# Patient Record
Sex: FEMALE | Race: OTHER | NOT HISPANIC OR LATINO
[De-identification: names, ages, dates, MRNs, and addresses within clinical notes are randomized per-mention and may not be internally consistent; named-entity substitution may affect disease eponyms.]

---

## 2017-01-16 ENCOUNTER — TRANSCRIPTION ENCOUNTER (OUTPATIENT)
Age: 37
End: 2017-01-16

## 2017-02-06 ENCOUNTER — TRANSCRIPTION ENCOUNTER (OUTPATIENT)
Age: 37
End: 2017-02-06

## 2023-04-19 ENCOUNTER — INPATIENT (INPATIENT)
Facility: HOSPITAL | Age: 43
LOS: 2 days | Discharge: HOME | DRG: 418 | End: 2023-04-22
Attending: STUDENT IN AN ORGANIZED HEALTH CARE EDUCATION/TRAINING PROGRAM | Admitting: STUDENT IN AN ORGANIZED HEALTH CARE EDUCATION/TRAINING PROGRAM
Payer: COMMERCIAL

## 2023-04-19 ENCOUNTER — TRANSCRIPTION ENCOUNTER (OUTPATIENT)
Age: 43
End: 2023-04-19

## 2023-04-19 VITALS
HEIGHT: 61 IN | WEIGHT: 259.93 LBS | DIASTOLIC BLOOD PRESSURE: 90 MMHG | RESPIRATION RATE: 16 BRPM | OXYGEN SATURATION: 97 % | SYSTOLIC BLOOD PRESSURE: 130 MMHG | HEART RATE: 73 BPM | TEMPERATURE: 98 F

## 2023-04-19 DIAGNOSIS — K81.0 ACUTE CHOLECYSTITIS: ICD-10-CM

## 2023-04-19 LAB
ANION GAP SERPL CALC-SCNC: 11 MMOL/L — SIGNIFICANT CHANGE UP (ref 5–17)
APPEARANCE UR: ABNORMAL
APTT BLD: 31.1 SEC — SIGNIFICANT CHANGE UP (ref 27.5–35.5)
BACTERIA # UR AUTO: PRESENT /HPF
BASOPHILS # BLD AUTO: 0.09 K/UL — SIGNIFICANT CHANGE UP (ref 0–0.2)
BASOPHILS NFR BLD AUTO: 0.7 % — SIGNIFICANT CHANGE UP (ref 0–2)
BILIRUB UR-MCNC: ABNORMAL
BLD GP AB SCN SERPL QL: NEGATIVE — SIGNIFICANT CHANGE UP
BUN SERPL-MCNC: 9 MG/DL — SIGNIFICANT CHANGE UP (ref 7–23)
CALCIUM SERPL-MCNC: 9.7 MG/DL — SIGNIFICANT CHANGE UP (ref 8.4–10.5)
CHLORIDE SERPL-SCNC: 101 MMOL/L — SIGNIFICANT CHANGE UP (ref 96–108)
CO2 SERPL-SCNC: 26 MMOL/L — SIGNIFICANT CHANGE UP (ref 22–31)
COLOR SPEC: YELLOW — SIGNIFICANT CHANGE UP
CREAT SERPL-MCNC: 0.75 MG/DL — SIGNIFICANT CHANGE UP (ref 0.5–1.3)
DIFF PNL FLD: NEGATIVE — SIGNIFICANT CHANGE UP
EGFR: 101 ML/MIN/1.73M2 — SIGNIFICANT CHANGE UP
EOSINOPHIL # BLD AUTO: 0.18 K/UL — SIGNIFICANT CHANGE UP (ref 0–0.5)
EOSINOPHIL NFR BLD AUTO: 1.4 % — SIGNIFICANT CHANGE UP (ref 0–6)
EPI CELLS # UR: ABNORMAL /HPF (ref 0–5)
GLUCOSE SERPL-MCNC: 141 MG/DL — HIGH (ref 70–99)
GLUCOSE UR QL: NEGATIVE — SIGNIFICANT CHANGE UP
HCG SERPL-ACNC: <0 MIU/ML — SIGNIFICANT CHANGE UP
HCT VFR BLD CALC: 43.1 % — SIGNIFICANT CHANGE UP (ref 34.5–45)
HGB BLD-MCNC: 13.9 G/DL — SIGNIFICANT CHANGE UP (ref 11.5–15.5)
HYALINE CASTS # UR AUTO: SIGNIFICANT CHANGE UP /LPF (ref 0–2)
IMM GRANULOCYTES NFR BLD AUTO: 0.2 % — SIGNIFICANT CHANGE UP (ref 0–0.9)
INR BLD: 1.22 — HIGH (ref 0.88–1.16)
KETONES UR-MCNC: ABNORMAL MG/DL
LEUKOCYTE ESTERASE UR-ACNC: NEGATIVE — SIGNIFICANT CHANGE UP
LYMPHOCYTES # BLD AUTO: 2.82 K/UL — SIGNIFICANT CHANGE UP (ref 1–3.3)
LYMPHOCYTES # BLD AUTO: 21.8 % — SIGNIFICANT CHANGE UP (ref 13–44)
MCHC RBC-ENTMCNC: 30.3 PG — SIGNIFICANT CHANGE UP (ref 27–34)
MCHC RBC-ENTMCNC: 32.3 GM/DL — SIGNIFICANT CHANGE UP (ref 32–36)
MCV RBC AUTO: 94.1 FL — SIGNIFICANT CHANGE UP (ref 80–100)
MONOCYTES # BLD AUTO: 0.9 K/UL — SIGNIFICANT CHANGE UP (ref 0–0.9)
MONOCYTES NFR BLD AUTO: 7 % — SIGNIFICANT CHANGE UP (ref 2–14)
NEUTROPHILS # BLD AUTO: 8.91 K/UL — HIGH (ref 1.8–7.4)
NEUTROPHILS NFR BLD AUTO: 68.9 % — SIGNIFICANT CHANGE UP (ref 43–77)
NITRITE UR-MCNC: NEGATIVE — SIGNIFICANT CHANGE UP
NRBC # BLD: 0 /100 WBCS — SIGNIFICANT CHANGE UP (ref 0–0)
PH UR: 5.5 — SIGNIFICANT CHANGE UP (ref 5–8)
PLATELET # BLD AUTO: 379 K/UL — SIGNIFICANT CHANGE UP (ref 150–400)
POTASSIUM SERPL-MCNC: 4.2 MMOL/L — SIGNIFICANT CHANGE UP (ref 3.5–5.3)
POTASSIUM SERPL-SCNC: 4.2 MMOL/L — SIGNIFICANT CHANGE UP (ref 3.5–5.3)
PROT UR-MCNC: 30 MG/DL
PROTHROM AB SERPL-ACNC: 14.6 SEC — HIGH (ref 10.5–13.4)
RBC # BLD: 4.58 M/UL — SIGNIFICANT CHANGE UP (ref 3.8–5.2)
RBC # FLD: 12.9 % — SIGNIFICANT CHANGE UP (ref 10.3–14.5)
RBC CASTS # UR COMP ASSIST: < 5 /HPF — SIGNIFICANT CHANGE UP
RH IG SCN BLD-IMP: POSITIVE — SIGNIFICANT CHANGE UP
RH IG SCN BLD-IMP: POSITIVE — SIGNIFICANT CHANGE UP
SODIUM SERPL-SCNC: 138 MMOL/L — SIGNIFICANT CHANGE UP (ref 135–145)
SP GR SPEC: >=1.03 — SIGNIFICANT CHANGE UP (ref 1–1.03)
UROBILINOGEN FLD QL: 0.2 E.U./DL — SIGNIFICANT CHANGE UP
WBC # BLD: 12.93 K/UL — HIGH (ref 3.8–10.5)
WBC # FLD AUTO: 12.93 K/UL — HIGH (ref 3.8–10.5)
WBC UR QL: < 5 /HPF — SIGNIFICANT CHANGE UP

## 2023-04-19 PROCEDURE — 99222 1ST HOSP IP/OBS MODERATE 55: CPT | Mod: 57

## 2023-04-19 PROCEDURE — 99285 EMERGENCY DEPT VISIT HI MDM: CPT

## 2023-04-19 PROCEDURE — 76705 ECHO EXAM OF ABDOMEN: CPT | Mod: 26

## 2023-04-19 RX ORDER — CEFTRIAXONE 500 MG/1
2000 INJECTION, POWDER, FOR SOLUTION INTRAMUSCULAR; INTRAVENOUS ONCE
Refills: 0 | Status: COMPLETED | OUTPATIENT
Start: 2023-04-19 | End: 2023-04-19

## 2023-04-19 RX ORDER — HYDROMORPHONE HYDROCHLORIDE 2 MG/ML
0.5 INJECTION INTRAMUSCULAR; INTRAVENOUS; SUBCUTANEOUS EVERY 4 HOURS
Refills: 0 | Status: DISCONTINUED | OUTPATIENT
Start: 2023-04-19 | End: 2023-04-20

## 2023-04-19 RX ORDER — ACETAMINOPHEN 500 MG
1000 TABLET ORAL ONCE
Refills: 0 | Status: COMPLETED | OUTPATIENT
Start: 2023-04-19 | End: 2023-04-19

## 2023-04-19 RX ORDER — SODIUM CHLORIDE 9 MG/ML
1000 INJECTION, SOLUTION INTRAVENOUS
Refills: 0 | Status: DISCONTINUED | OUTPATIENT
Start: 2023-04-19 | End: 2023-04-20

## 2023-04-19 RX ORDER — SODIUM CHLORIDE 9 MG/ML
1000 INJECTION INTRAMUSCULAR; INTRAVENOUS; SUBCUTANEOUS ONCE
Refills: 0 | Status: COMPLETED | OUTPATIENT
Start: 2023-04-19 | End: 2023-04-19

## 2023-04-19 RX ORDER — MORPHINE SULFATE 50 MG/1
4 CAPSULE, EXTENDED RELEASE ORAL ONCE
Refills: 0 | Status: DISCONTINUED | OUTPATIENT
Start: 2023-04-19 | End: 2023-04-19

## 2023-04-19 RX ORDER — PIPERACILLIN AND TAZOBACTAM 4; .5 G/20ML; G/20ML
3.38 INJECTION, POWDER, LYOPHILIZED, FOR SOLUTION INTRAVENOUS ONCE
Refills: 0 | Status: COMPLETED | OUTPATIENT
Start: 2023-04-19 | End: 2023-04-19

## 2023-04-19 RX ORDER — ONDANSETRON 8 MG/1
4 TABLET, FILM COATED ORAL ONCE
Refills: 0 | Status: COMPLETED | OUTPATIENT
Start: 2023-04-19 | End: 2023-04-19

## 2023-04-19 RX ORDER — METRONIDAZOLE 500 MG
500 TABLET ORAL ONCE
Refills: 0 | Status: COMPLETED | OUTPATIENT
Start: 2023-04-19 | End: 2023-04-19

## 2023-04-19 RX ORDER — HEPARIN SODIUM 5000 [USP'U]/ML
7500 INJECTION INTRAVENOUS; SUBCUTANEOUS EVERY 8 HOURS
Refills: 0 | Status: DISCONTINUED | OUTPATIENT
Start: 2023-04-19 | End: 2023-04-20

## 2023-04-19 RX ORDER — ONDANSETRON 8 MG/1
4 TABLET, FILM COATED ORAL EVERY 6 HOURS
Refills: 0 | Status: DISCONTINUED | OUTPATIENT
Start: 2023-04-19 | End: 2023-04-20

## 2023-04-19 RX ORDER — PIPERACILLIN AND TAZOBACTAM 4; .5 G/20ML; G/20ML
3.38 INJECTION, POWDER, LYOPHILIZED, FOR SOLUTION INTRAVENOUS EVERY 8 HOURS
Refills: 0 | Status: DISCONTINUED | OUTPATIENT
Start: 2023-04-19 | End: 2023-04-20

## 2023-04-19 RX ADMIN — Medication 400 MILLIGRAM(S): at 10:30

## 2023-04-19 RX ADMIN — CEFTRIAXONE 100 MILLIGRAM(S): 500 INJECTION, POWDER, FOR SOLUTION INTRAMUSCULAR; INTRAVENOUS at 06:39

## 2023-04-19 RX ADMIN — ONDANSETRON 4 MILLIGRAM(S): 8 TABLET, FILM COATED ORAL at 07:42

## 2023-04-19 RX ADMIN — PIPERACILLIN AND TAZOBACTAM 25 GRAM(S): 4; .5 INJECTION, POWDER, LYOPHILIZED, FOR SOLUTION INTRAVENOUS at 22:21

## 2023-04-19 RX ADMIN — HYDROMORPHONE HYDROCHLORIDE 0.5 MILLIGRAM(S): 2 INJECTION INTRAMUSCULAR; INTRAVENOUS; SUBCUTANEOUS at 19:00

## 2023-04-19 RX ADMIN — HYDROMORPHONE HYDROCHLORIDE 0.5 MILLIGRAM(S): 2 INJECTION INTRAMUSCULAR; INTRAVENOUS; SUBCUTANEOUS at 15:33

## 2023-04-19 RX ADMIN — CEFTRIAXONE 2000 MILLIGRAM(S): 500 INJECTION, POWDER, FOR SOLUTION INTRAMUSCULAR; INTRAVENOUS at 07:05

## 2023-04-19 RX ADMIN — HEPARIN SODIUM 7500 UNIT(S): 5000 INJECTION INTRAVENOUS; SUBCUTANEOUS at 14:21

## 2023-04-19 RX ADMIN — MORPHINE SULFATE 4 MILLIGRAM(S): 50 CAPSULE, EXTENDED RELEASE ORAL at 06:46

## 2023-04-19 RX ADMIN — Medication 500 MILLIGRAM(S): at 06:40

## 2023-04-19 RX ADMIN — PIPERACILLIN AND TAZOBACTAM 200 GRAM(S): 4; .5 INJECTION, POWDER, LYOPHILIZED, FOR SOLUTION INTRAVENOUS at 13:07

## 2023-04-19 RX ADMIN — HYDROMORPHONE HYDROCHLORIDE 0.5 MILLIGRAM(S): 2 INJECTION INTRAMUSCULAR; INTRAVENOUS; SUBCUTANEOUS at 14:22

## 2023-04-19 RX ADMIN — HYDROMORPHONE HYDROCHLORIDE 0.5 MILLIGRAM(S): 2 INJECTION INTRAMUSCULAR; INTRAVENOUS; SUBCUTANEOUS at 23:56

## 2023-04-19 RX ADMIN — MORPHINE SULFATE 4 MILLIGRAM(S): 50 CAPSULE, EXTENDED RELEASE ORAL at 04:06

## 2023-04-19 RX ADMIN — SODIUM CHLORIDE 130 MILLILITER(S): 9 INJECTION, SOLUTION INTRAVENOUS at 12:49

## 2023-04-19 RX ADMIN — ONDANSETRON 4 MILLIGRAM(S): 8 TABLET, FILM COATED ORAL at 17:09

## 2023-04-19 RX ADMIN — ONDANSETRON 4 MILLIGRAM(S): 8 TABLET, FILM COATED ORAL at 04:06

## 2023-04-19 RX ADMIN — Medication 100 MILLIGRAM(S): at 06:46

## 2023-04-19 RX ADMIN — HYDROMORPHONE HYDROCHLORIDE 0.5 MILLIGRAM(S): 2 INJECTION INTRAMUSCULAR; INTRAVENOUS; SUBCUTANEOUS at 23:26

## 2023-04-19 RX ADMIN — HEPARIN SODIUM 7500 UNIT(S): 5000 INJECTION INTRAVENOUS; SUBCUTANEOUS at 22:21

## 2023-04-19 RX ADMIN — MORPHINE SULFATE 4 MILLIGRAM(S): 50 CAPSULE, EXTENDED RELEASE ORAL at 04:36

## 2023-04-19 RX ADMIN — ONDANSETRON 4 MILLIGRAM(S): 8 TABLET, FILM COATED ORAL at 23:26

## 2023-04-19 RX ADMIN — HYDROMORPHONE HYDROCHLORIDE 0.5 MILLIGRAM(S): 2 INJECTION INTRAMUSCULAR; INTRAVENOUS; SUBCUTANEOUS at 18:22

## 2023-04-19 RX ADMIN — SODIUM CHLORIDE 1000 MILLILITER(S): 9 INJECTION INTRAMUSCULAR; INTRAVENOUS; SUBCUTANEOUS at 04:06

## 2023-04-19 NOTE — ED PROVIDER NOTE - OBJECTIVE STATEMENT
42 yr old female, denies medical history, no surg hx, presents to the Emergency Department with abd pain. initially w upper abd pain / bloating sensation two days ago. eventually resolved on its own. saw a GI doctor yesterday who daija labs and ordered a RUQ US (scheduled for later today). since appointment had multiple episodes of pain. most recent one started at 8pm tonight - had eaten dinner at 6pm. sharp / stabbing pain upper abd into right back. nausea w/o vomiting. feels bloated.   no fever, chest pain, sob, v/d, urinary symptoms.   has had some upper abd discomfort and bloating in past but nothing similar to this pain.

## 2023-04-19 NOTE — H&P ADULT - HISTORY OF PRESENT ILLNESS
42 year old female with no past medical or surgical history presenting to the ED with RUQ pain x 4 days. Patient reports after eating a cream cheese bagel on Sunday, she developed severe RUQ pain. Patient describes the pain as dull and intermittent for the past 4 days ago but since last night has been constant. Patient reports the pain worsens after eating a fatty meal. She reports the pain is associated with abdominal bloating, nausea, and vomiting. She reports she had a similar episode 7 days ago which self-resolved. She reports she has had 4 episodes of non-bilious emesis since Sunday. She reports she saw a gastroenterologist x3 days ago who ordered an abdominal ultrasound (scheduled for today) and prescribed her an antacid (unknown name) that provided some relief yesterday. Patient reports she is passing flatus and having bowel movements. Patient denies past abdominal surgery, fever, chills, chest pain, hematochezia, melena, hematuria, dysuria.          VS T 98.6, HR 60, /77, RR 18 , O2 98%       Pmhx   -Denies    Pshx   -Denies     Home medications   -None    WBC 12.93 , h/h 13.9/43.1 , plt 379     PT 14.6, INR 1.22, aptt 31.1     Na 138, K 4.2, Cl 101 , CO 26, BUN 9, Cr 0.75, gluc 141, t bili 0.4 , direct bili <0.2, alk phos 65, AST 15, ALT 7, Lipase 24          US: Bile ducts: Common bile duct measures 11 mm, which is thickened.     Gallbladder: Cholelithiasis. Gallbladder wall is thickened to 0.6 cm,      there is pericholecystic fluid and positive sonographic Walters's sign.     IMPRESSION: Cholelithiasis with acute cholecystitis.

## 2023-04-19 NOTE — H&P ADULT - ASSESSMENT
42 year old female with no significant past medical history presents with cholecystitis  NPO/IVF  Pain/nausea control  OOBA/SCDs/SQH  AM labs  OR for lap cholecystectomy    Patient  seen and examined with Dr. Hauser 42 year old female with no significant past medical history presents with cholecystitis  NPO/IVF  Pain/nausea control  OOBA/SCDs/SQH  AM labs  Zosyn  OR for lap cholecystectomy    Patient  seen and examined with Dr. Hauser

## 2023-04-19 NOTE — PATIENT PROFILE ADULT - SAFE PLACE TO LIVE
[Home] : at home, [unfilled] , at the time of the visit. [Family Member] : family member [Medical Office: ([facility])___] : at the medical office located in  [Verbal consent obtained from patient] : the patient, [unfilled] [Other: ___] : [unfilled] [FreeTextEntry1] : "Trigeminal neuralgia" [de-identified] : Susie Leger is a pleasant 53 year old lady who presented with TN (right side)for about 8-9 years.  Pain is present once per month for about 5 - 10 days interval.  She had a recent ER visit 4/27/10 with pain 10/10 that was present for about 6 weeks. Pain medications was increased by her neurologist however the pain did not subside.   Pain is increased with washing her face, eating, touching.  Pain is not relieved  with Tegretol 600 mg 3 x day and Tegretol 300 mg 2 x day as well as Gabapentin 400 mg in the morning 300 mg in the evening.  Pain has been constant at 10/10 without any relief.  Burchiel Type 1 TN described.\par \par Her Neurologist is Dr. Anastasia Escobar.  her Endocrinologist Dr. Nehemiah Maxwell.  PCP is Dr. Ken John.\par \par She has a medical history of migraines, arthritis, fibromyalgia, NAKUL (she does not use machines because she is claustrophobic), and HLD, DM.  Most recent HgbA1C was 11.2 last week after 1 month treatment for bronchitis with Prednisone.\par \par Telehealth visit started at 12:30 - 1248 pm.\par \par BMI is 56.\par  no

## 2023-04-19 NOTE — ED PROVIDER NOTE - PROGRESS NOTE DETAILS
grey -   wbc count 12.93. labs otherwise ok - lfts / lipase wnl.   UA w/o infection.   US w cholelithiasis / acute cholecystitis.   informed pt of results, pain controlled / rpt abd exam benign. given abx. surgery consulted. to see pt.   signed out to day team pending surg eval / admission. Alma - signed out to me pending surgery dispo - pt to be admitted for acute choley.  Pt stable at admission.

## 2023-04-19 NOTE — ED PROVIDER NOTE - CLINICAL SUMMARY MEDICAL DECISION MAKING FREE TEXT BOX
no medical / surgical. here w two days of upper abd pain. intermittent but now constant since 8pm last night. nausea w/o vomiting.   pt appears uncomfortable but nontoxic, stable vitals, exam w epigastric and ruq tenderness.   hx and exam most concerning for cholelithiasis / cholecystitis. ddx also includes gerd, gastritis, pud, pancreatitis  plan - labs, ruq us  analgesia / antiemetics prn  serial abd exams

## 2023-04-19 NOTE — ED ADULT NURSE REASSESSMENT NOTE - NS ED NURSE REASSESS COMMENT FT1
Patient c/o sudden onset of chest pressure and nausea. MD Marquez made aware, ekg completed.
Report received from night RN. Pt resting comfortably in stretcher. AOx4. Breathing spontaneously and unlabored. Awaiting to see.
surgery @ bedside
Patient stated no longer have chest discomfort at this time, refused morphine. Nausea has greatly subsided after Zofran.

## 2023-04-19 NOTE — ED PROVIDER NOTE - PHYSICAL EXAMINATION
Constitutional : appears uncomfortable but nontoxic.  awake, alert, oriented to person, place, time/situation.  Head : head normocephalic, atraumatic  EENMT : eyes clear bilaterally, PERRL, EOMI. airway patent. moist mucous membranes. neck supple.  Cardiac : Normal rate, regular rhythm. No murmur appreciated, no LE edema.  Resp : Breath sounds clear and equal bilaterally. Respirations even and unlabored.   Gastro : abdomen soft, nondistended. +epigastric and RUQ tenderness. no rebound or guarding. no CVAT.  MSK :  range of motion is not limited, no muscle or joint tenderness  Vasc : Extremities warm and well perfused. 2+ radial and DP pulses. cap refill <2 seconds  Neuro : Alert and oriented, CNII-XII grossly intact, no focal deficits, no motor or sensory deficits.  Skin : Skin normal color for race, warm, dry and intact. No evidence of rash.  Psych : Alert and oriented to person, place, time/situation. normal mood and affect. no apparent risk to self or others.

## 2023-04-20 ENCOUNTER — TRANSCRIPTION ENCOUNTER (OUTPATIENT)
Age: 43
End: 2023-04-20

## 2023-04-20 ENCOUNTER — RESULT REVIEW (OUTPATIENT)
Age: 43
End: 2023-04-20

## 2023-04-20 LAB
ALBUMIN SERPL ELPH-MCNC: 3.8 G/DL — SIGNIFICANT CHANGE UP (ref 3.3–5)
ALP SERPL-CCNC: 71 U/L — SIGNIFICANT CHANGE UP (ref 40–120)
ALT FLD-CCNC: 23 U/L — SIGNIFICANT CHANGE UP (ref 10–45)
ANION GAP SERPL CALC-SCNC: 13 MMOL/L — SIGNIFICANT CHANGE UP (ref 5–17)
AST SERPL-CCNC: 37 U/L — SIGNIFICANT CHANGE UP (ref 10–40)
BILIRUB SERPL-MCNC: 0.3 MG/DL — SIGNIFICANT CHANGE UP (ref 0.2–1.2)
BUN SERPL-MCNC: 8 MG/DL — SIGNIFICANT CHANGE UP (ref 7–23)
CALCIUM SERPL-MCNC: 8.9 MG/DL — SIGNIFICANT CHANGE UP (ref 8.4–10.5)
CHLORIDE SERPL-SCNC: 103 MMOL/L — SIGNIFICANT CHANGE UP (ref 96–108)
CO2 SERPL-SCNC: 22 MMOL/L — SIGNIFICANT CHANGE UP (ref 22–31)
CREAT SERPL-MCNC: 0.77 MG/DL — SIGNIFICANT CHANGE UP (ref 0.5–1.3)
EGFR: 98 ML/MIN/1.73M2 — SIGNIFICANT CHANGE UP
GLUCOSE SERPL-MCNC: 144 MG/DL — HIGH (ref 70–99)
HCT VFR BLD CALC: 41.8 % — SIGNIFICANT CHANGE UP (ref 34.5–45)
HGB BLD-MCNC: 13.6 G/DL — SIGNIFICANT CHANGE UP (ref 11.5–15.5)
MAGNESIUM SERPL-MCNC: 2 MG/DL — SIGNIFICANT CHANGE UP (ref 1.6–2.6)
MCHC RBC-ENTMCNC: 30.2 PG — SIGNIFICANT CHANGE UP (ref 27–34)
MCHC RBC-ENTMCNC: 32.5 GM/DL — SIGNIFICANT CHANGE UP (ref 32–36)
MCV RBC AUTO: 92.9 FL — SIGNIFICANT CHANGE UP (ref 80–100)
NRBC # BLD: 0 /100 WBCS — SIGNIFICANT CHANGE UP (ref 0–0)
PHOSPHATE SERPL-MCNC: 3.3 MG/DL — SIGNIFICANT CHANGE UP (ref 2.5–4.5)
PLATELET # BLD AUTO: 400 K/UL — SIGNIFICANT CHANGE UP (ref 150–400)
POTASSIUM SERPL-MCNC: 4.1 MMOL/L — SIGNIFICANT CHANGE UP (ref 3.5–5.3)
POTASSIUM SERPL-SCNC: 4.1 MMOL/L — SIGNIFICANT CHANGE UP (ref 3.5–5.3)
PROT SERPL-MCNC: 7 G/DL — SIGNIFICANT CHANGE UP (ref 6–8.3)
RBC # BLD: 4.5 M/UL — SIGNIFICANT CHANGE UP (ref 3.8–5.2)
RBC # FLD: 13 % — SIGNIFICANT CHANGE UP (ref 10.3–14.5)
SARS-COV-2 RNA SPEC QL NAA+PROBE: SIGNIFICANT CHANGE UP
SODIUM SERPL-SCNC: 138 MMOL/L — SIGNIFICANT CHANGE UP (ref 135–145)
WBC # BLD: 11.91 K/UL — HIGH (ref 3.8–10.5)
WBC # FLD AUTO: 11.91 K/UL — HIGH (ref 3.8–10.5)

## 2023-04-20 PROCEDURE — 47562 LAPAROSCOPIC CHOLECYSTECTOMY: CPT | Mod: 22

## 2023-04-20 PROCEDURE — 88304 TISSUE EXAM BY PATHOLOGIST: CPT | Mod: 26

## 2023-04-20 PROCEDURE — S2900 ROBOTIC SURGICAL SYSTEM: CPT | Mod: NC

## 2023-04-20 DEVICE — ARISTA 3GR: Type: IMPLANTABLE DEVICE | Status: FUNCTIONAL

## 2023-04-20 DEVICE — LIGATING CLIPS WECK HEMOLOK POLYMER LARGE (PURPLE) 6: Type: IMPLANTABLE DEVICE | Status: FUNCTIONAL

## 2023-04-20 RX ORDER — ONDANSETRON 8 MG/1
4 TABLET, FILM COATED ORAL EVERY 6 HOURS
Refills: 0 | Status: DISCONTINUED | OUTPATIENT
Start: 2023-04-20 | End: 2023-04-22

## 2023-04-20 RX ORDER — PIPERACILLIN AND TAZOBACTAM 4; .5 G/20ML; G/20ML
3.38 INJECTION, POWDER, LYOPHILIZED, FOR SOLUTION INTRAVENOUS EVERY 8 HOURS
Refills: 0 | Status: DISCONTINUED | OUTPATIENT
Start: 2023-04-20 | End: 2023-04-22

## 2023-04-20 RX ORDER — HYDROMORPHONE HYDROCHLORIDE 2 MG/ML
0.5 INJECTION INTRAMUSCULAR; INTRAVENOUS; SUBCUTANEOUS
Refills: 0 | Status: DISCONTINUED | OUTPATIENT
Start: 2023-04-20 | End: 2023-04-22

## 2023-04-20 RX ORDER — PANTOPRAZOLE SODIUM 20 MG/1
40 TABLET, DELAYED RELEASE ORAL ONCE
Refills: 0 | Status: COMPLETED | OUTPATIENT
Start: 2023-04-20 | End: 2023-04-20

## 2023-04-20 RX ORDER — OXYCODONE HYDROCHLORIDE 5 MG/1
5 TABLET ORAL EVERY 6 HOURS
Refills: 0 | Status: DISCONTINUED | OUTPATIENT
Start: 2023-04-20 | End: 2023-04-22

## 2023-04-20 RX ORDER — ACETAMINOPHEN 500 MG
650 TABLET ORAL EVERY 6 HOURS
Refills: 0 | Status: DISCONTINUED | OUTPATIENT
Start: 2023-04-20 | End: 2023-04-21

## 2023-04-20 RX ORDER — ONDANSETRON 8 MG/1
4 TABLET, FILM COATED ORAL ONCE
Refills: 0 | Status: COMPLETED | OUTPATIENT
Start: 2023-04-20 | End: 2023-04-20

## 2023-04-20 RX ORDER — FAMOTIDINE 10 MG/ML
20 INJECTION INTRAVENOUS ONCE
Refills: 0 | Status: COMPLETED | OUTPATIENT
Start: 2023-04-20 | End: 2023-04-20

## 2023-04-20 RX ORDER — OXYCODONE HYDROCHLORIDE 5 MG/1
10 TABLET ORAL EVERY 6 HOURS
Refills: 0 | Status: DISCONTINUED | OUTPATIENT
Start: 2023-04-20 | End: 2023-04-22

## 2023-04-20 RX ORDER — ACETAMINOPHEN 500 MG
1000 TABLET ORAL ONCE
Refills: 0 | Status: COMPLETED | OUTPATIENT
Start: 2023-04-20 | End: 2023-04-20

## 2023-04-20 RX ORDER — HEPARIN SODIUM 5000 [USP'U]/ML
7500 INJECTION INTRAVENOUS; SUBCUTANEOUS EVERY 8 HOURS
Refills: 0 | Status: DISCONTINUED | OUTPATIENT
Start: 2023-04-20 | End: 2023-04-22

## 2023-04-20 RX ORDER — SODIUM CHLORIDE 9 MG/ML
1000 INJECTION, SOLUTION INTRAVENOUS
Refills: 0 | Status: DISCONTINUED | OUTPATIENT
Start: 2023-04-20 | End: 2023-04-21

## 2023-04-20 RX ADMIN — Medication 400 MILLIGRAM(S): at 12:59

## 2023-04-20 RX ADMIN — PIPERACILLIN AND TAZOBACTAM 25 GRAM(S): 4; .5 INJECTION, POWDER, LYOPHILIZED, FOR SOLUTION INTRAVENOUS at 13:30

## 2023-04-20 RX ADMIN — Medication 1000 MILLIGRAM(S): at 13:15

## 2023-04-20 RX ADMIN — OXYCODONE HYDROCHLORIDE 10 MILLIGRAM(S): 5 TABLET ORAL at 22:21

## 2023-04-20 RX ADMIN — Medication 400 MILLIGRAM(S): at 18:36

## 2023-04-20 RX ADMIN — HEPARIN SODIUM 7500 UNIT(S): 5000 INJECTION INTRAVENOUS; SUBCUTANEOUS at 21:02

## 2023-04-20 RX ADMIN — PIPERACILLIN AND TAZOBACTAM 25 GRAM(S): 4; .5 INJECTION, POWDER, LYOPHILIZED, FOR SOLUTION INTRAVENOUS at 22:22

## 2023-04-20 RX ADMIN — Medication 400 MILLIGRAM(S): at 01:19

## 2023-04-20 RX ADMIN — Medication 1000 MILLIGRAM(S): at 19:22

## 2023-04-20 RX ADMIN — Medication 1000 MILLIGRAM(S): at 01:49

## 2023-04-20 RX ADMIN — SODIUM CHLORIDE 130 MILLILITER(S): 9 INJECTION, SOLUTION INTRAVENOUS at 13:00

## 2023-04-20 RX ADMIN — HYDROMORPHONE HYDROCHLORIDE 0.5 MILLIGRAM(S): 2 INJECTION INTRAMUSCULAR; INTRAVENOUS; SUBCUTANEOUS at 13:30

## 2023-04-20 RX ADMIN — HYDROMORPHONE HYDROCHLORIDE 0.5 MILLIGRAM(S): 2 INJECTION INTRAMUSCULAR; INTRAVENOUS; SUBCUTANEOUS at 14:00

## 2023-04-20 RX ADMIN — OXYCODONE HYDROCHLORIDE 10 MILLIGRAM(S): 5 TABLET ORAL at 23:21

## 2023-04-20 RX ADMIN — ONDANSETRON 4 MILLIGRAM(S): 8 TABLET, FILM COATED ORAL at 22:22

## 2023-04-20 RX ADMIN — ONDANSETRON 4 MILLIGRAM(S): 8 TABLET, FILM COATED ORAL at 18:36

## 2023-04-20 RX ADMIN — PANTOPRAZOLE SODIUM 40 MILLIGRAM(S): 20 TABLET, DELAYED RELEASE ORAL at 18:36

## 2023-04-20 RX ADMIN — FAMOTIDINE 20 MILLIGRAM(S): 10 INJECTION INTRAVENOUS at 23:10

## 2023-04-20 NOTE — PRE-ANESTHESIA EVALUATION ADULT - NSANTHOSAYNRD_GEN_A_CORE
No. CIRO screening performed.  STOP BANG Legend: 0-2 = LOW Risk; 3-4 = INTERMEDIATE Risk; 5-8 = HIGH Risk
20 Hour(s) 32 Minute(s)

## 2023-04-20 NOTE — BRIEF OPERATIVE NOTE - ESTIMATED BLOOD LOSS
74
Detail Level: Detailed
General Sunscreen Counseling: I recommended a broad spectrum sunscreen with a SPF of 30 or higher.  I explained that SPF 30 sunscreens block approximately 97 percent of the sun's harmful rays.  Sunscreens should be applied at least 15 minutes prior to expected sun exposure and then every 2 hours after that as long as sun exposure continues. If swimming or exercising sunscreen should be reapplied every 45 minutes to an hour after getting wet or sweating.  One ounce, or the equivalent of a shot glass full of sunscreen, is adequate to protect the skin not covered by a bathing suit. I also recommended a lip balm with a sunscreen as well. Sun protective clothing can be used in lieu of sunscreen but must be worn the entire time you are exposed to the sun's rays.

## 2023-04-20 NOTE — PRE-ANESTHESIA EVALUATION ADULT - NSANTHPEFT_GEN_ALL_CORE
General: Appearance is consistent with chronological age. No abnormal facies.  EENT: Anicteric sclera; oropharynx clear, moist mucus membranes  Cardiovascular:  Rate and rhythm evaluated  Respiratory: Unlabored breathing  Neurological: Awake and alert, moves all extremities  Constitutional: ambulatory

## 2023-04-20 NOTE — PRE-ANESTHESIA EVALUATION ADULT - NSRADCARDRESULTSFT_GEN_ALL_CORE
US: Bile ducts: Common bile duct measures 11 mm, which is thickened.     Gallbladder: Cholelithiasis. Gallbladder wall is thickened to 0.6 cm,      there is pericholecystic fluid and positive sonographic Walters's sign.

## 2023-04-20 NOTE — CHART NOTE - NSCHARTNOTEFT_GEN_A_CORE
Procedure: RA partial cholecystectomy   Surgeon: Murtaza     S: Pt seen in PACU.  No acute complaints.  Pain controlled with medication. Tolerating ice chips. No nausea or vomiting. Has not yet ambulated, urinated, passed gas, or had a bowel movement since surgery.  Denies CP, SOB, WHITESIDE, calf tenderness.     O:  T(C): 36.5 (04-20-23 @ 14:07), Max: 36.6 (04-20-23 @ 12:27)  T(F): 97.7 (04-20-23 @ 14:07), Max: 97.9 (04-20-23 @ 12:27)  HR: 78 (04-20-23 @ 14:07) (71 - 81)  BP: 110/59 (04-20-23 @ 14:07) (110/59 - 128/58)  RR: 18 (04-20-23 @ 14:07) (16 - 28)  SpO2: 94% (04-20-23 @ 14:07) (93% - 95%)  Wt(kg): --                        13.6   11.91 )-----------( 400      ( 20 Apr 2023 12:45 )             41.8     04-20    138  |  103  |  8   ----------------------------<  144<H>  4.1   |  22  |  0.77    Ca    8.9      20 Apr 2023 12:45  Phos  3.3     04-20  Mg     2.0     04-20    TPro  7.0  /  Alb  3.8  /  TBili  0.3  /  DBili  x   /  AST  37  /  ALT  23  /  AlkPhos  71  04-20      Gen: Awake, Alert, NAD, resting comfortably in bed  C/V: NSR  Pulm: Nonlabored breathing, no respiratory distress  Abd: soft, non-distended, appropriately tender to palpation, no rebound/guarding, incisions c/d/i, LUCITA x minimal serosang   Extrem: WWP, no calf edema, SCDs in place      42 year old female with no significant past medical history presents with cholecystitis. s/p RA partial cholecystectomy (4/20)    CLD  Zosyn x24h postop  Pain/nausea control  OOBA/SCDs/SQH  AM labs  LUCITA x1

## 2023-04-20 NOTE — BRIEF OPERATIVE NOTE - NSICDXBRIEFPROCEDURE_GEN_ALL_CORE_FT
PROCEDURES:  Robot-assisted laparoscopic partial cholecystectomy 20-Apr-2023 12:34:44  Vinicius Pacheco

## 2023-04-20 NOTE — BRIEF OPERATIVE NOTE - OPERATION/FINDINGS
intubated, prepped, draped. Insufflation via veress needle. ICG given IV. 8mm trochar inserted x4. Robot docked. Friable and fatty gallbladder identified. Cystic duct dissected and clipped. Difficult dissection of cystic duct. Large stone noted in neck. Silk sutures tied proximally and distally to stone at neck, divided at stone, stone removed. GB stump explored. ICG visualized distal structure of CBD. GB stump oversewn with PDS x2. Gallbladder removed from liver bed. hemostasis achieved. Stones and specimens removed. Drain left in GB fossa.

## 2023-04-21 ENCOUNTER — TRANSCRIPTION ENCOUNTER (OUTPATIENT)
Age: 43
End: 2023-04-21

## 2023-04-21 LAB
ALBUMIN SERPL ELPH-MCNC: 3.3 G/DL — SIGNIFICANT CHANGE UP (ref 3.3–5)
ALP SERPL-CCNC: 58 U/L — SIGNIFICANT CHANGE UP (ref 40–120)
ALT FLD-CCNC: 14 U/L — SIGNIFICANT CHANGE UP (ref 10–45)
ANION GAP SERPL CALC-SCNC: 8 MMOL/L — SIGNIFICANT CHANGE UP (ref 5–17)
AST SERPL-CCNC: 21 U/L — SIGNIFICANT CHANGE UP (ref 10–40)
BILIRUB SERPL-MCNC: 0.4 MG/DL — SIGNIFICANT CHANGE UP (ref 0.2–1.2)
BUN SERPL-MCNC: 7 MG/DL — SIGNIFICANT CHANGE UP (ref 7–23)
CALCIUM SERPL-MCNC: 8.9 MG/DL — SIGNIFICANT CHANGE UP (ref 8.4–10.5)
CHLORIDE SERPL-SCNC: 102 MMOL/L — SIGNIFICANT CHANGE UP (ref 96–108)
CO2 SERPL-SCNC: 25 MMOL/L — SIGNIFICANT CHANGE UP (ref 22–31)
CREAT SERPL-MCNC: 0.88 MG/DL — SIGNIFICANT CHANGE UP (ref 0.5–1.3)
EGFR: 84 ML/MIN/1.73M2 — SIGNIFICANT CHANGE UP
GLUCOSE SERPL-MCNC: 85 MG/DL — SIGNIFICANT CHANGE UP (ref 70–99)
HCT VFR BLD CALC: 37.6 % — SIGNIFICANT CHANGE UP (ref 34.5–45)
HGB BLD-MCNC: 11.6 G/DL — SIGNIFICANT CHANGE UP (ref 11.5–15.5)
MAGNESIUM SERPL-MCNC: 2 MG/DL — SIGNIFICANT CHANGE UP (ref 1.6–2.6)
MCHC RBC-ENTMCNC: 30.4 PG — SIGNIFICANT CHANGE UP (ref 27–34)
MCHC RBC-ENTMCNC: 30.9 GM/DL — LOW (ref 32–36)
MCV RBC AUTO: 98.4 FL — SIGNIFICANT CHANGE UP (ref 80–100)
NRBC # BLD: 0 /100 WBCS — SIGNIFICANT CHANGE UP (ref 0–0)
PHOSPHATE SERPL-MCNC: 2.8 MG/DL — SIGNIFICANT CHANGE UP (ref 2.5–4.5)
PLATELET # BLD AUTO: 314 K/UL — SIGNIFICANT CHANGE UP (ref 150–400)
POTASSIUM SERPL-MCNC: 4 MMOL/L — SIGNIFICANT CHANGE UP (ref 3.5–5.3)
POTASSIUM SERPL-SCNC: 4 MMOL/L — SIGNIFICANT CHANGE UP (ref 3.5–5.3)
PROT SERPL-MCNC: 6.2 G/DL — SIGNIFICANT CHANGE UP (ref 6–8.3)
RBC # BLD: 3.82 M/UL — SIGNIFICANT CHANGE UP (ref 3.8–5.2)
RBC # FLD: 13.3 % — SIGNIFICANT CHANGE UP (ref 10.3–14.5)
SODIUM SERPL-SCNC: 135 MMOL/L — SIGNIFICANT CHANGE UP (ref 135–145)
WBC # BLD: 11.86 K/UL — HIGH (ref 3.8–10.5)
WBC # FLD AUTO: 11.86 K/UL — HIGH (ref 3.8–10.5)

## 2023-04-21 PROCEDURE — 99222 1ST HOSP IP/OBS MODERATE 55: CPT

## 2023-04-21 RX ORDER — ACETAMINOPHEN 500 MG
1000 TABLET ORAL ONCE
Refills: 0 | Status: COMPLETED | OUTPATIENT
Start: 2023-04-21 | End: 2023-04-21

## 2023-04-21 RX ORDER — SODIUM CHLORIDE 9 MG/ML
1000 INJECTION, SOLUTION INTRAVENOUS
Refills: 0 | Status: DISCONTINUED | OUTPATIENT
Start: 2023-04-21 | End: 2023-04-21

## 2023-04-21 RX ADMIN — PIPERACILLIN AND TAZOBACTAM 25 GRAM(S): 4; .5 INJECTION, POWDER, LYOPHILIZED, FOR SOLUTION INTRAVENOUS at 22:30

## 2023-04-21 RX ADMIN — SODIUM CHLORIDE 70 MILLILITER(S): 9 INJECTION, SOLUTION INTRAVENOUS at 09:20

## 2023-04-21 RX ADMIN — PIPERACILLIN AND TAZOBACTAM 25 GRAM(S): 4; .5 INJECTION, POWDER, LYOPHILIZED, FOR SOLUTION INTRAVENOUS at 06:08

## 2023-04-21 RX ADMIN — Medication 400 MILLIGRAM(S): at 19:19

## 2023-04-21 RX ADMIN — Medication 400 MILLIGRAM(S): at 13:26

## 2023-04-21 RX ADMIN — OXYCODONE HYDROCHLORIDE 10 MILLIGRAM(S): 5 TABLET ORAL at 06:09

## 2023-04-21 RX ADMIN — Medication 1000 MILLIGRAM(S): at 13:41

## 2023-04-21 RX ADMIN — Medication 400 MILLIGRAM(S): at 07:49

## 2023-04-21 RX ADMIN — OXYCODONE HYDROCHLORIDE 10 MILLIGRAM(S): 5 TABLET ORAL at 07:09

## 2023-04-21 RX ADMIN — HEPARIN SODIUM 7500 UNIT(S): 5000 INJECTION INTRAVENOUS; SUBCUTANEOUS at 21:57

## 2023-04-21 RX ADMIN — Medication 400 MILLIGRAM(S): at 01:11

## 2023-04-21 RX ADMIN — ONDANSETRON 4 MILLIGRAM(S): 8 TABLET, FILM COATED ORAL at 06:09

## 2023-04-21 RX ADMIN — HEPARIN SODIUM 7500 UNIT(S): 5000 INJECTION INTRAVENOUS; SUBCUTANEOUS at 05:21

## 2023-04-21 RX ADMIN — PIPERACILLIN AND TAZOBACTAM 25 GRAM(S): 4; .5 INJECTION, POWDER, LYOPHILIZED, FOR SOLUTION INTRAVENOUS at 14:09

## 2023-04-21 RX ADMIN — Medication 1000 MILLIGRAM(S): at 01:41

## 2023-04-21 RX ADMIN — HEPARIN SODIUM 7500 UNIT(S): 5000 INJECTION INTRAVENOUS; SUBCUTANEOUS at 13:26

## 2023-04-21 NOTE — CONSULT NOTE ADULT - SUBJECTIVE AND OBJECTIVE BOX
Patient is a 42y old  Female who presents with a chief complaint of     HPI:  42 year old female with no past medical or surgical history presenting to the ED with RUQ pain x 4 days. Patient reports after eating a cream cheese bagel on Sunday, she developed severe RUQ pain. Patient describes the pain as dull and intermittent for the past 4 days ago but since last night has been constant. Patient reports the pain worsens after eating a fatty meal. She reports the pain is associated with abdominal bloating, nausea, and vomiting. She reports she had a similar episode 7 days ago which self-resolved. She reports she has had 4 episodes of non-bilious emesis since Sunday. She reports she saw a gastroenterologist x3 days ago who ordered an abdominal ultrasound (scheduled for today) and prescribed her an antacid (unknown name) that provided some relief yesterday. Patient reports she is passing flatus and having bowel movements. Patient denies past abdominal surgery, fever, chills, chest pain, hematochezia, melena, hematuria, dysuria.          VS T 98.6, HR 60, /77, RR 18 , O2 98%       Pmhx   -Denies    Pshx   -Denies     Home medications   -None    WBC 12.93 , h/h 13.9/43.1 , plt 379     PT 14.6, INR 1.22, aptt 31.1     Na 138, K 4.2, Cl 101 , CO 26, BUN 9, Cr 0.75, gluc 141, t bili 0.4 , direct bili <0.2, alk phos 65, AST 15, ALT 7, Lipase 24          US: Bile ducts: Common bile duct measures 11 mm, which is thickened.     Gallbladder: Cholelithiasis. Gallbladder wall is thickened to 0.6 cm,      there is pericholecystic fluid and positive sonographic Walters's sign.     IMPRESSION: Cholelithiasis with acute cholecystitis.  (19 Apr 2023 12:13)      Allergies    No Known Allergies    Intolerances        MEDICATIONS  (STANDING):  acetaminophen   IVPB .. 1000 milliGRAM(s) IV Intermittent once  acetaminophen   IVPB .. 1000 milliGRAM(s) IV Intermittent once  dextrose 5% + sodium chloride 0.45%. 1000 milliLiter(s) (70 mL/Hr) IV Continuous <Continuous>  heparin   Injectable 7500 Unit(s) SubCutaneous every 8 hours  piperacillin/tazobactam IVPB.. 3.375 Gram(s) IV Intermittent every 8 hours    MEDICATIONS  (PRN):  HYDROmorphone  Injectable 0.5 milliGRAM(s) IV Push every 30 minutes PRN Severe Pain (7 - 10)  ondansetron Injectable 4 milliGRAM(s) IV Push every 6 hours PRN Nausea and/or Vomiting  oxyCODONE    IR 5 milliGRAM(s) Oral every 6 hours PRN Moderate Pain (4 - 6)  oxyCODONE    IR 10 milliGRAM(s) Oral every 6 hours PRN Severe Pain (7 - 10)      Daily     Daily     Drug Dosing Weight  Height (cm): 154.9 (20 Apr 2023 06:38)  Weight (kg): 117.9 (20 Apr 2023 06:38)  BMI (kg/m2): 49.1 (20 Apr 2023 06:38)  BSA (m2): 2.11 (20 Apr 2023 06:38)    PAST MEDICAL & SURGICAL HISTORY:  No pertinent past medical history      No significant past surgical history          FAMILY HISTORY:        REVIEW OF SYSTEMS:    CONSTITUTIONAL: No fever, weight loss, or fatigue  EYES: No eye pain, visual disturbances, or discharge  ENMT:  No difficulty hearing, tinnitus, vertigo; No sinus or throat pain  NECK: No pain or stiffness  RESPIRATORY: No cough, wheezing, chills or hemoptysis; No shortness of breath  CARDIOVASCULAR: No chest pain, palpitations, dizziness, or leg swelling  GASTROINTESTINAL: + abdominal pain. No nausea, vomiting, or hematemesis; No diarrhea or constipation. No melena or hematochezia.  GENITOURINARY: No dysuria, frequency, hematuria, or incontinence  LYMPH NODES: No enlarged glands  ENDOCRINE: No heat or cold intolerance; No hair loss  MUSCULOSKELETAL: No joint pain or swelling; No muscle, back, or extremity pain  NEUROLOGICAL: No headaches, memory loss, loss of strength, numbness, or tremors  SKIN: No itching, burning, rashes, or lesion           Vital Signs Last 24 Hrs  T(C): 37.1 (21 Apr 2023 06:01), Max: 37.1 (21 Apr 2023 06:01)  T(F): 98.7 (21 Apr 2023 06:01), Max: 98.7 (21 Apr 2023 06:01)  HR: 62 (21 Apr 2023 06:01) (61 - 81)  BP: 128/82 (21 Apr 2023 06:01) (105/56 - 128/82)  BP(mean): 97 (21 Apr 2023 06:01) (76 - 97)  ABP: --  ABP(mean): --  RR: 17 (21 Apr 2023 06:01) (15 - 28)  SpO2: 95% (21 Apr 2023 06:01) (92% - 95%)    O2 Parameters below as of 21 Apr 2023 06:01  Patient On (Oxygen Delivery Method): room air                I&O's Detail    20 Apr 2023 07:01  -  21 Apr 2023 07:00  --------------------------------------------------------  IN:    IV PiggyBack: 100 mL    Lactated Ringers: 3089 mL    Oral Fluid: 100 mL  Total IN: 3289 mL    OUT:    Bulb (mL): 15 mL    Voided (mL): 1100 mL  Total OUT: 1115 mL    Total NET: 2174 mL      21 Apr 2023 07:01  -  21 Apr 2023 09:58  --------------------------------------------------------  IN:    dextrose 5% + sodium chloride 0.45%: 210 mL    Oral Fluid: 120 mL  Total IN: 330 mL    OUT:    Bulb (mL): 10 mL  Total OUT: 10 mL    Total NET: 320 mL          PHYSICAL EXAM:    GENERAL: NAD, well-groomed, well-developed  HEAD:  Atraumatic, Normocephalic  EYES: EOMI, PERRLA, conjunctiva and sclera clear  ENMT: No tonsillar erythema, exudates, or enlargement; Moist mucous membranes, Good dentition, No lesions  NECK: Supple, No JVD, Normal thyroid  NERVOUS SYSTEM:  Alert & Oriented X3, Good concentration; Motor Strength 5/5 B/L upper and lower extremities;   CHEST/LUNG: Clear to percussion bilaterally; No rales, rhonchi, wheezing, or rubs  HEART: Regular rate and rhythm; No murmurs, rubs, or gallops  ABDOMEN: Soft, TTP, Nondistended; Bowel sounds present, LUCITA drain w minimal outpt   EXTREMITIES:  2+ Peripheral Pulses, No clubbing, cyanosis, or edema  LYMPH: No lymphadenopathy noted  SKIN: No rashes or lesions    LABS:  CBC Full  -  ( 21 Apr 2023 07:25 )  WBC Count : 11.86 K/uL  RBC Count : 3.82 M/uL  Hemoglobin : 11.6 g/dL  Hematocrit : 37.6 %  Platelet Count - Automated : 314 K/uL  Mean Cell Volume : 98.4 fl  Mean Cell Hemoglobin : 30.4 pg  Mean Cell Hemoglobin Concentration : 30.9 gm/dL  Auto Neutrophil # : x  Auto Lymphocyte # : x  Auto Monocyte # : x  Auto Eosinophil # : x  Auto Basophil # : x  Auto Neutrophil % : x  Auto Lymphocyte % : x  Auto Monocyte % : x  Auto Eosinophil % : x  Auto Basophil % : x    04-21    135  |  102  |  7   ----------------------------<  85  4.0   |  25  |  0.88    Ca    8.9      21 Apr 2023 07:25  Phos  2.8     04-21  Mg     2.0     04-21    TPro  6.2  /  Alb  3.3  /  TBili  0.4  /  DBili  x   /  AST  21  /  ALT  14  /  AlkPhos  58  04-21    CAPILLARY BLOOD GLUCOSE                    EKG:    ECHO, US:    RADIOLOGY:  < from: US Abdomen Upper Quadrant Right (04.19.23 @ 05:26) >  IMPRESSION:  Cholelithiasis with acute cholecystitis.  Mildly dilated common bile duct. No sonographic evidence of   choledocholithiasis. No intrahepatic bile duct dilatation.    < end of copied text >

## 2023-04-21 NOTE — DISCHARGE NOTE PROVIDER - NSDCFUADDINST_GEN_ALL_CORE_FT
Please follow up with your primary care physician in 1-2 weeks for re-evaluation.     Pain control:  Please continue to take 2 tabs of extra strength tylenol as needed for pain. DO NOT exceed 4000 mg per day.  You have been prescribed oxycodone for pain not controlled by Tylenol. Take 1 tab every 6 hours as needed for severe pain. Please do not exceed 4 tabs per day. Take prescribed stool softener (colace) to prevent constipation caused by oxycodone.    Medications:  Please resume all regular home medications unless specifically advised not to take a particular medication.   Also, please take any new medications as prescribed.    Incision care:  *Please call your doctor or nurse practitioner if you have increased pain, swelling, redness, or drainage from the incision site.  *Please avoid swimming and bathing until your follow up appointment. You may shower, and wash surgical incisions with a mild soap and warm water. Gently pat the area dry.  *If you have staples, they will be removed at your follow-up appointment.  *If you have steri-strips, they will fall off on their own. Please remove any remaining strips 7-10 days after surgery.    Please get plenty of rest, continue to ambulate several times per day, and drink adequate amounts of fluids.   Avoid lifting weights greater than 5-10 lbs until you follow-up with your surgeon, who will instruct you further regarding activity restrictions.  Avoid driving or operating heavy machinery while taking pain medications.  Please follow-up with your surgeon and Primary Care Provider (PCP) as advised.     Please call your doctor if you experience any of the following:  -New chest pain, pressure, squeezing or tightness.  -New or worsening cough, shortness of breath or wheeze.  -If you are vomiting and cannot keep down medications.  -You see blood or dark black material when vomiting or moving your bowels.  -You experience burning when you urinate, blood in your urine, or experience discharge.  -Your pain is getting worse changes location or moves to your chest or back.  -You have shaking, chills, or fever greater than 100.5F or 38C.  -Any changes in your symptoms that concern you.

## 2023-04-21 NOTE — DISCHARGE NOTE PROVIDER - HOSPITAL COURSE
42 year old female PMHx morbid obesity presents with cholecystitis. US 4/19 demonstrated Cholelithiasis with acute cholecystitis, Mildly dilated common bile duct. No sonographic evidence of  choledocholithiasis. No intrahepatic bile duct dilatation. Patient was admitted to General Surgery for further management. Pt was made NPO and received IV antibiotics, IV fluids and adequate pain medication.  Pt was taken to the OR for a laparoscopic cholecystectomy. In the OR, there was difficulty dissecting the cystic duct with large stone at the gallbladder neck, the gallbladder was divided at the stone, stone removed, and GB stump explored and oversewn. s/p robotic assisted partial cholecystectomy (4/20). Diet advanced as tolerated without nausea/vomiting, ambulating without difficulty, and voiding spontaneously. Pain is well controlled on oral pain medication. Pt has been deemed ready for discharge and will follow up with the surgeon. 42 year old female PMHx morbid obesity presents with cholecystitis. US 4/19 demonstrated Cholelithiasis with acute cholecystitis, Mildly dilated common bile duct. No sonographic evidence of  choledocholithiasis. No intrahepatic bile duct dilatation. Patient was admitted to General Surgery for further management. Pt was made NPO and received IV antibiotics, IV fluids and adequate pain medication.  Pt was taken to the OR for a laparoscopic cholecystectomy. In the OR, there was difficulty dissecting the cystic duct with large stone at the gallbladder neck, the gallbladder was divided at the stone, stone removed, and GB stump explored and oversewn. s/p robotic assisted partial cholecystectomy (4/20). Diet advanced as tolerated without nausea/vomiting, ambulating without difficulty, and voiding spontaneously. LUCITA removed before discharge. Antibiotics discontinued on discharge. Pain is well controlled on oral pain medication. Pt has been deemed ready for discharge and will follow up with the surgeon.

## 2023-04-21 NOTE — DISCHARGE NOTE PROVIDER - NSDCCPTREATMENT_GEN_ALL_CORE_FT
PRINCIPAL PROCEDURE  Procedure: Robot-assisted laparoscopic partial cholecystectomy  Findings and Treatment:

## 2023-04-21 NOTE — DISCHARGE NOTE PROVIDER - CARE PROVIDER_API CALL
Jason Hauser (MD)  Surgery  [address]  [place], [place] [address]  Phone: [phone]  Fax: [phone]  Follow Up Time: 2 weeks

## 2023-04-21 NOTE — CONSULT NOTE ADULT - ASSESSMENT
42 year old female with hx of gerd and hx of fibroids presents with Abdominal pain. Pt admitted for acute cholecystitis.       #acute cholecystitis   #post operative state   US as above   s/p RA cholecystectomy (4/20)  OOTB to chair   on zosyn per primary   wbc 12k - 11k   Encourage ambulation ; Encourage incentive spirometry   CLD   rest of the management per primary team      #Gerd   can do pepcid if symptoms reoccur     #dvp ppx scd, hsq

## 2023-04-21 NOTE — DISCHARGE NOTE PROVIDER - NSDCFUADDAPPT_GEN_ALL_CORE_FT
Please follow up with Dr. Hauser in the office in 1-2 weeks. Call the office to schedule an appointment. [phone]

## 2023-04-21 NOTE — DISCHARGE NOTE PROVIDER - NSDCMRMEDTOKEN_GEN_ALL_CORE_FT
Colace 100 mg oral capsule: 1 cap(s) orally every 12 hours as needed for  constipation  oxyCODONE 5 mg oral tablet: 1 tab(s) orally every 6 hours as needed for  severe pain MDD: 4 tabs

## 2023-04-21 NOTE — DISCHARGE NOTE PROVIDER - NSDCCPCAREPLAN_GEN_ALL_CORE_FT
PRINCIPAL DISCHARGE DIAGNOSIS  Diagnosis: Acute cholecystitis  Assessment and Plan of Treatment:       SECONDARY DISCHARGE DIAGNOSES  Diagnosis: Morbid obesity  Assessment and Plan of Treatment:

## 2023-04-22 ENCOUNTER — TRANSCRIPTION ENCOUNTER (OUTPATIENT)
Age: 43
End: 2023-04-22

## 2023-04-22 VITALS
RESPIRATION RATE: 17 BRPM | DIASTOLIC BLOOD PRESSURE: 76 MMHG | SYSTOLIC BLOOD PRESSURE: 113 MMHG | TEMPERATURE: 98 F | OXYGEN SATURATION: 95 % | HEART RATE: 76 BPM

## 2023-04-22 LAB
ANION GAP SERPL CALC-SCNC: 9 MMOL/L — SIGNIFICANT CHANGE UP (ref 5–17)
BUN SERPL-MCNC: 6 MG/DL — LOW (ref 7–23)
CALCIUM SERPL-MCNC: 8.4 MG/DL — SIGNIFICANT CHANGE UP (ref 8.4–10.5)
CHLORIDE SERPL-SCNC: 105 MMOL/L — SIGNIFICANT CHANGE UP (ref 96–108)
CO2 SERPL-SCNC: 25 MMOL/L — SIGNIFICANT CHANGE UP (ref 22–31)
CREAT SERPL-MCNC: 0.95 MG/DL — SIGNIFICANT CHANGE UP (ref 0.5–1.3)
EGFR: 76 ML/MIN/1.73M2 — SIGNIFICANT CHANGE UP
GLUCOSE SERPL-MCNC: 99 MG/DL — SIGNIFICANT CHANGE UP (ref 70–99)
HCT VFR BLD CALC: 37.2 % — SIGNIFICANT CHANGE UP (ref 34.5–45)
HGB BLD-MCNC: 11.6 G/DL — SIGNIFICANT CHANGE UP (ref 11.5–15.5)
MAGNESIUM SERPL-MCNC: 2 MG/DL — SIGNIFICANT CHANGE UP (ref 1.6–2.6)
MCHC RBC-ENTMCNC: 30.4 PG — SIGNIFICANT CHANGE UP (ref 27–34)
MCHC RBC-ENTMCNC: 31.2 GM/DL — LOW (ref 32–36)
MCV RBC AUTO: 97.4 FL — SIGNIFICANT CHANGE UP (ref 80–100)
NRBC # BLD: 0 /100 WBCS — SIGNIFICANT CHANGE UP (ref 0–0)
PHOSPHATE SERPL-MCNC: 2.9 MG/DL — SIGNIFICANT CHANGE UP (ref 2.5–4.5)
PLATELET # BLD AUTO: 317 K/UL — SIGNIFICANT CHANGE UP (ref 150–400)
POTASSIUM SERPL-MCNC: 4 MMOL/L — SIGNIFICANT CHANGE UP (ref 3.5–5.3)
POTASSIUM SERPL-SCNC: 4 MMOL/L — SIGNIFICANT CHANGE UP (ref 3.5–5.3)
RBC # BLD: 3.82 M/UL — SIGNIFICANT CHANGE UP (ref 3.8–5.2)
RBC # FLD: 13.5 % — SIGNIFICANT CHANGE UP (ref 10.3–14.5)
SODIUM SERPL-SCNC: 139 MMOL/L — SIGNIFICANT CHANGE UP (ref 135–145)
WBC # BLD: 9.97 K/UL — SIGNIFICANT CHANGE UP (ref 3.8–10.5)
WBC # FLD AUTO: 9.97 K/UL — SIGNIFICANT CHANGE UP (ref 3.8–10.5)

## 2023-04-22 PROCEDURE — 99231 SBSQ HOSP IP/OBS SF/LOW 25: CPT

## 2023-04-22 PROCEDURE — 85027 COMPLETE CBC AUTOMATED: CPT

## 2023-04-22 PROCEDURE — 86901 BLOOD TYPING SEROLOGIC RH(D): CPT

## 2023-04-22 PROCEDURE — 83690 ASSAY OF LIPASE: CPT

## 2023-04-22 PROCEDURE — 80048 BASIC METABOLIC PNL TOTAL CA: CPT

## 2023-04-22 PROCEDURE — 87635 SARS-COV-2 COVID-19 AMP PRB: CPT

## 2023-04-22 PROCEDURE — 96375 TX/PRO/DX INJ NEW DRUG ADDON: CPT

## 2023-04-22 PROCEDURE — 96376 TX/PRO/DX INJ SAME DRUG ADON: CPT

## 2023-04-22 PROCEDURE — C9399: CPT

## 2023-04-22 PROCEDURE — 86850 RBC ANTIBODY SCREEN: CPT

## 2023-04-22 PROCEDURE — 80076 HEPATIC FUNCTION PANEL: CPT

## 2023-04-22 PROCEDURE — 83735 ASSAY OF MAGNESIUM: CPT

## 2023-04-22 PROCEDURE — 96365 THER/PROPH/DIAG IV INF INIT: CPT

## 2023-04-22 PROCEDURE — 76705 ECHO EXAM OF ABDOMEN: CPT

## 2023-04-22 PROCEDURE — 88304 TISSUE EXAM BY PATHOLOGIST: CPT

## 2023-04-22 PROCEDURE — 85730 THROMBOPLASTIN TIME PARTIAL: CPT

## 2023-04-22 PROCEDURE — 85025 COMPLETE CBC W/AUTO DIFF WBC: CPT

## 2023-04-22 PROCEDURE — 84702 CHORIONIC GONADOTROPIN TEST: CPT

## 2023-04-22 PROCEDURE — 99285 EMERGENCY DEPT VISIT HI MDM: CPT | Mod: 25

## 2023-04-22 PROCEDURE — 36415 COLL VENOUS BLD VENIPUNCTURE: CPT

## 2023-04-22 PROCEDURE — S2900: CPT

## 2023-04-22 PROCEDURE — 80053 COMPREHEN METABOLIC PANEL: CPT

## 2023-04-22 PROCEDURE — 86900 BLOOD TYPING SEROLOGIC ABO: CPT

## 2023-04-22 PROCEDURE — 81001 URINALYSIS AUTO W/SCOPE: CPT

## 2023-04-22 PROCEDURE — C1889: CPT

## 2023-04-22 PROCEDURE — 85610 PROTHROMBIN TIME: CPT

## 2023-04-22 PROCEDURE — 84100 ASSAY OF PHOSPHORUS: CPT

## 2023-04-22 RX ORDER — OXYCODONE HYDROCHLORIDE 5 MG/1
1 TABLET ORAL
Qty: 12 | Refills: 0
Start: 2023-04-22 | End: 2023-04-24

## 2023-04-22 RX ORDER — DOCUSATE SODIUM 100 MG
1 CAPSULE ORAL
Qty: 20 | Refills: 0
Start: 2023-04-22

## 2023-04-22 RX ADMIN — HEPARIN SODIUM 7500 UNIT(S): 5000 INJECTION INTRAVENOUS; SUBCUTANEOUS at 13:21

## 2023-04-22 RX ADMIN — OXYCODONE HYDROCHLORIDE 10 MILLIGRAM(S): 5 TABLET ORAL at 03:57

## 2023-04-22 RX ADMIN — HEPARIN SODIUM 7500 UNIT(S): 5000 INJECTION INTRAVENOUS; SUBCUTANEOUS at 05:27

## 2023-04-22 RX ADMIN — PIPERACILLIN AND TAZOBACTAM 25 GRAM(S): 4; .5 INJECTION, POWDER, LYOPHILIZED, FOR SOLUTION INTRAVENOUS at 06:44

## 2023-04-22 RX ADMIN — ONDANSETRON 4 MILLIGRAM(S): 8 TABLET, FILM COATED ORAL at 03:56

## 2023-04-22 RX ADMIN — OXYCODONE HYDROCHLORIDE 10 MILLIGRAM(S): 5 TABLET ORAL at 04:50

## 2023-04-22 NOTE — PROGRESS NOTE ADULT - ASSESSMENT
"Problem: Adult Inpatient Plan of Care  Goal: Patient-Specific Goal (Individualized)  Outcome: Ongoing, Progressing  Flowsheets (Taken 3/10/2022 1033)  Anxieties, Fears or Concerns: Denies  Individualized Care Needs: Recliner, snacks  Patient-Specific Goals (Include Timeframe): Patient will have no s/s of adverse medication reactions during or post infusion. /73 (Patient Position: Sitting)   Pulse 63   Temp 97.9 °F (36.6 °C)   Resp 18   Ht 5' 4" (1.626 m)   Wt 77.3 kg (170 lb 6.7 oz)   LMP  (LMP Unknown)   SpO2 98%   BMI 29.25 kg/m² Patient arrived in OPT for Ferrlecit infusion #6/8, identified by name and , pleasant and appropriate with slow and steady gait observed. PIV started to left inner F/A x1 attempts with #24 jelco, immediate flashback observed, site secured, flushed without difficulty, and patient tolerated procedure well. All questions and concerns addressed, appointments for infusions scheduled out. Patient resting in recliner on personal computer, no acute or respiratory distress observed, and will continue to monitor.       "
"Problem: Adult Inpatient Plan of Care  Goal: Plan of Care Review  Outcome: Ongoing, Progressing  Flowsheets (Taken 3/10/2022 1149)  Plan of Care Reviewed With: patient  BP (!) 115/55 (Patient Position: Sitting)   Pulse 60   Temp 97.9 °F (36.6 °C)   Resp 18   Ht 5' 4" (1.626 m)   Wt 77.3 kg (170 lb 6.7 oz)   LMP  (LMP Unknown)   SpO2 98%   BMI 29.25 kg/m²   Patient tolerated Ferrelicit infusion without adverse medication reactions, and no acute or respiratory distress noted. Patient ambulated to private vehicle per self with slow and steady gait observed.        "
42 year old female with hx of gerd and hx of fibroids presents with Abdominal pain. Pt admitted for acute cholecystitis.       #acute cholecystitis   #post operative state   s/p RA cholecystectomy (4/20)  OOTB to chair   on zosyn per primary   wbc wnl  Encourage ambulation ; Encourage incentive spirometry   rest of the management per primary team  LUCITA drain removed  for d/c home todayl      #Gerd   can do pepcid if symptoms reoccur     #dvp ppx scd, hsq   dispo: home today
42 year old female PMHx morbid obesity presents with cholecystitis. s/p RA partial cholecystectomy (4/20)    Low fat  No abx on discharge   Pain/nausea control  OOBA/SCDs/SQH  remove drain prior to discharge   dispo home today 
42 year old female with no significant past medical history presents with cholecystitis. s/p RA partial cholecystectomy (4/20)    CLD  Zosyn x24h postop  Pain/nausea control  OOBA/SCDs/SQH  AM labs  LUCITA x1

## 2023-04-22 NOTE — DISCHARGE NOTE NURSING/CASE MANAGEMENT/SOCIAL WORK - NSDCFUADDAPPT_GEN_ALL_CORE_FT
Please follow up with Dr. Hauser in the office in 1-2 weeks. Call the office to schedule an appointment. [phone]

## 2023-04-22 NOTE — DISCHARGE NOTE NURSING/CASE MANAGEMENT/SOCIAL WORK - PATIENT PORTAL LINK FT
You can access the FollowMyHealth Patient Portal offered by [facility] by registering at the following website: [email or web address]. By joining [employer]’s FollowMyHealth portal, you will also be able to view your health information using other applications (apps) compatible with our system.

## 2023-04-24 PROBLEM — Z00.00 ENCOUNTER FOR PREVENTIVE HEALTH EXAMINATION: Status: ACTIVE | Noted: 2023-04-24

## 2023-04-25 ENCOUNTER — APPOINTMENT (OUTPATIENT)
Dept: BARIATRICS | Facility: CLINIC | Age: 43
End: 2023-04-25
Payer: COMMERCIAL

## 2023-04-25 ENCOUNTER — OUTPATIENT (OUTPATIENT)
Dept: OUTPATIENT SERVICES | Facility: HOSPITAL | Age: 43
LOS: 1 days | End: 2023-04-25
Payer: COMMERCIAL

## 2023-04-25 ENCOUNTER — TRANSCRIPTION ENCOUNTER (OUTPATIENT)
Age: 43
End: 2023-04-25

## 2023-04-25 ENCOUNTER — INPATIENT (INPATIENT)
Facility: HOSPITAL | Age: 43
LOS: 3 days | Discharge: HOME | DRG: 444 | End: 2023-04-29
Attending: SURGERY | Admitting: SURGERY
Payer: COMMERCIAL

## 2023-04-25 ENCOUNTER — APPOINTMENT (OUTPATIENT)
Dept: ULTRASOUND IMAGING | Facility: HOSPITAL | Age: 43
End: 2023-04-25

## 2023-04-25 VITALS
WEIGHT: 257.94 LBS | SYSTOLIC BLOOD PRESSURE: 142 MMHG | OXYGEN SATURATION: 99 % | HEIGHT: 61 IN | HEART RATE: 61 BPM | RESPIRATION RATE: 16 BRPM | DIASTOLIC BLOOD PRESSURE: 100 MMHG | TEMPERATURE: 98 F

## 2023-04-25 VITALS
WEIGHT: 256 LBS | HEART RATE: 78 BPM | TEMPERATURE: 97.6 F | HEIGHT: 61 IN | SYSTOLIC BLOOD PRESSURE: 135 MMHG | OXYGEN SATURATION: 98 % | BODY MASS INDEX: 48.33 KG/M2 | DIASTOLIC BLOOD PRESSURE: 81 MMHG

## 2023-04-25 DIAGNOSIS — R10.9 UNSPECIFIED ABDOMINAL PAIN: ICD-10-CM

## 2023-04-25 LAB
ALBUMIN SERPL ELPH-MCNC: 4.2 G/DL — SIGNIFICANT CHANGE UP (ref 3.3–5)
ALBUMIN SERPL ELPH-MCNC: 4.4 G/DL
ALP BLD-CCNC: 76 U/L
ALP SERPL-CCNC: 71 U/L — SIGNIFICANT CHANGE UP (ref 40–120)
ALT FLD-CCNC: 68 U/L — HIGH (ref 10–45)
ALT SERPL-CCNC: 68 U/L
ANION GAP SERPL CALC-SCNC: 12 MMOL/L
ANION GAP SERPL CALC-SCNC: 12 MMOL/L — SIGNIFICANT CHANGE UP (ref 5–17)
APPEARANCE UR: CLEAR — SIGNIFICANT CHANGE UP
APTT BLD: 32.5 SEC — SIGNIFICANT CHANGE UP (ref 27.5–35.5)
AST SERPL-CCNC: 100 U/L — HIGH (ref 10–40)
AST SERPL-CCNC: 105 U/L
BACTERIA # UR AUTO: PRESENT /HPF
BASOPHILS # BLD AUTO: 0.08 K/UL — SIGNIFICANT CHANGE UP (ref 0–0.2)
BASOPHILS # BLD AUTO: 0.09 K/UL
BASOPHILS NFR BLD AUTO: 0.9 % — SIGNIFICANT CHANGE UP (ref 0–2)
BASOPHILS NFR BLD AUTO: 1.1 %
BILIRUB SERPL-MCNC: 0.3 MG/DL
BILIRUB SERPL-MCNC: 0.3 MG/DL — SIGNIFICANT CHANGE UP (ref 0.2–1.2)
BILIRUB UR-MCNC: NEGATIVE — SIGNIFICANT CHANGE UP
BLD GP AB SCN SERPL QL: NEGATIVE — SIGNIFICANT CHANGE UP
BUN SERPL-MCNC: 8 MG/DL
BUN SERPL-MCNC: 8 MG/DL — SIGNIFICANT CHANGE UP (ref 7–23)
CALCIUM SERPL-MCNC: 10.4 MG/DL — SIGNIFICANT CHANGE UP (ref 8.4–10.5)
CALCIUM SERPL-MCNC: 9.8 MG/DL
CHLORIDE SERPL-SCNC: 100 MMOL/L — SIGNIFICANT CHANGE UP (ref 96–108)
CHLORIDE SERPL-SCNC: 102 MMOL/L
CO2 SERPL-SCNC: 26 MMOL/L — SIGNIFICANT CHANGE UP (ref 22–31)
CO2 SERPL-SCNC: 27 MMOL/L
COLOR SPEC: YELLOW — SIGNIFICANT CHANGE UP
COMMENT - URINE: SIGNIFICANT CHANGE UP
CREAT SERPL-MCNC: 0.7 MG/DL — SIGNIFICANT CHANGE UP (ref 0.5–1.3)
CREAT SERPL-MCNC: 0.78 MG/DL
DIFF PNL FLD: ABNORMAL
EGFR: 110 ML/MIN/1.73M2 — SIGNIFICANT CHANGE UP
EGFR: 97 ML/MIN/1.73M2
EOSINOPHIL # BLD AUTO: 0.24 K/UL
EOSINOPHIL # BLD AUTO: 0.29 K/UL — SIGNIFICANT CHANGE UP (ref 0–0.5)
EOSINOPHIL NFR BLD AUTO: 3 %
EOSINOPHIL NFR BLD AUTO: 3.4 % — SIGNIFICANT CHANGE UP (ref 0–6)
EPI CELLS # UR: ABNORMAL /HPF (ref 0–5)
GLUCOSE SERPL-MCNC: 117 MG/DL
GLUCOSE SERPL-MCNC: 98 MG/DL — SIGNIFICANT CHANGE UP (ref 70–99)
GLUCOSE UR QL: NEGATIVE — SIGNIFICANT CHANGE UP
HCT VFR BLD CALC: 43.2 % — SIGNIFICANT CHANGE UP (ref 34.5–45)
HCT VFR BLD CALC: 43.3 %
HGB BLD-MCNC: 13.5 G/DL — SIGNIFICANT CHANGE UP (ref 11.5–15.5)
HGB BLD-MCNC: 13.7 G/DL
IMM GRANULOCYTES NFR BLD AUTO: 0.2 % — SIGNIFICANT CHANGE UP (ref 0–0.9)
IMM GRANULOCYTES NFR BLD AUTO: 0.4 %
INR BLD: 1.07 — SIGNIFICANT CHANGE UP (ref 0.88–1.16)
KETONES UR-MCNC: ABNORMAL MG/DL
LEUKOCYTE ESTERASE UR-ACNC: ABNORMAL
LIDOCAIN IGE QN: 28 U/L — SIGNIFICANT CHANGE UP (ref 7–60)
LYMPHOCYTES # BLD AUTO: 2.36 K/UL
LYMPHOCYTES # BLD AUTO: 2.71 K/UL — SIGNIFICANT CHANGE UP (ref 1–3.3)
LYMPHOCYTES # BLD AUTO: 31.7 % — SIGNIFICANT CHANGE UP (ref 13–44)
LYMPHOCYTES NFR BLD AUTO: 29.8 %
MAN DIFF?: NORMAL
MCHC RBC-ENTMCNC: 29.9 PG — SIGNIFICANT CHANGE UP (ref 27–34)
MCHC RBC-ENTMCNC: 30.4 PG
MCHC RBC-ENTMCNC: 31.3 GM/DL — LOW (ref 32–36)
MCHC RBC-ENTMCNC: 31.6 GM/DL
MCV RBC AUTO: 95.6 FL — SIGNIFICANT CHANGE UP (ref 80–100)
MCV RBC AUTO: 96 FL
MONOCYTES # BLD AUTO: 0.62 K/UL
MONOCYTES # BLD AUTO: 0.69 K/UL — SIGNIFICANT CHANGE UP (ref 0–0.9)
MONOCYTES NFR BLD AUTO: 7.8 %
MONOCYTES NFR BLD AUTO: 8.1 % — SIGNIFICANT CHANGE UP (ref 2–14)
NEUTROPHILS # BLD AUTO: 4.58 K/UL
NEUTROPHILS # BLD AUTO: 4.75 K/UL — SIGNIFICANT CHANGE UP (ref 1.8–7.4)
NEUTROPHILS NFR BLD AUTO: 55.7 % — SIGNIFICANT CHANGE UP (ref 43–77)
NEUTROPHILS NFR BLD AUTO: 57.9 %
NITRITE UR-MCNC: NEGATIVE — SIGNIFICANT CHANGE UP
NRBC # BLD: 0 /100 WBCS — SIGNIFICANT CHANGE UP (ref 0–0)
PH UR: 6 — SIGNIFICANT CHANGE UP (ref 5–8)
PLATELET # BLD AUTO: 404 K/UL — HIGH (ref 150–400)
PLATELET # BLD AUTO: 408 K/UL
POTASSIUM SERPL-MCNC: 4 MMOL/L — SIGNIFICANT CHANGE UP (ref 3.5–5.3)
POTASSIUM SERPL-SCNC: 4 MMOL/L — SIGNIFICANT CHANGE UP (ref 3.5–5.3)
POTASSIUM SERPL-SCNC: 5.3 MMOL/L
PROT SERPL-MCNC: 6.8 G/DL
PROT SERPL-MCNC: 7.3 G/DL — SIGNIFICANT CHANGE UP (ref 6–8.3)
PROT UR-MCNC: NEGATIVE MG/DL — SIGNIFICANT CHANGE UP
PROTHROM AB SERPL-ACNC: 12.8 SEC — SIGNIFICANT CHANGE UP (ref 10.5–13.4)
RBC # BLD: 4.51 M/UL
RBC # BLD: 4.52 M/UL — SIGNIFICANT CHANGE UP (ref 3.8–5.2)
RBC # FLD: 13.1 % — SIGNIFICANT CHANGE UP (ref 10.3–14.5)
RBC # FLD: 13.2 %
RBC CASTS # UR COMP ASSIST: > 10 /HPF
RH IG SCN BLD-IMP: POSITIVE — SIGNIFICANT CHANGE UP
SARS-COV-2 RNA SPEC QL NAA+PROBE: NEGATIVE — SIGNIFICANT CHANGE UP
SODIUM SERPL-SCNC: 138 MMOL/L — SIGNIFICANT CHANGE UP (ref 135–145)
SODIUM SERPL-SCNC: 141 MMOL/L
SP GR SPEC: 1.02 — SIGNIFICANT CHANGE UP (ref 1–1.03)
UROBILINOGEN FLD QL: 0.2 E.U./DL — SIGNIFICANT CHANGE UP
WBC # BLD: 8.54 K/UL — SIGNIFICANT CHANGE UP (ref 3.8–10.5)
WBC # FLD AUTO: 7.92 K/UL
WBC # FLD AUTO: 8.54 K/UL — SIGNIFICANT CHANGE UP (ref 3.8–10.5)
WBC UR QL: > 10 /HPF

## 2023-04-25 PROCEDURE — 99024 POSTOP FOLLOW-UP VISIT: CPT

## 2023-04-25 PROCEDURE — 76700 US EXAM ABDOM COMPLETE: CPT

## 2023-04-25 PROCEDURE — 76700 US EXAM ABDOM COMPLETE: CPT | Mod: 26

## 2023-04-25 PROCEDURE — 99221 1ST HOSP IP/OBS SF/LOW 40: CPT | Mod: 24

## 2023-04-25 PROCEDURE — 99285 EMERGENCY DEPT VISIT HI MDM: CPT

## 2023-04-25 RX ORDER — SODIUM CHLORIDE 9 MG/ML
1000 INJECTION, SOLUTION INTRAVENOUS
Refills: 0 | Status: DISCONTINUED | OUTPATIENT
Start: 2023-04-25 | End: 2023-04-28

## 2023-04-25 RX ORDER — CEFPODOXIME PROXETIL 100 MG
200 TABLET ORAL EVERY 12 HOURS
Refills: 0 | Status: COMPLETED | OUTPATIENT
Start: 2023-04-25 | End: 2023-04-28

## 2023-04-25 RX ORDER — SODIUM CHLORIDE 9 MG/ML
1000 INJECTION INTRAMUSCULAR; INTRAVENOUS; SUBCUTANEOUS ONCE
Refills: 0 | Status: COMPLETED | OUTPATIENT
Start: 2023-04-25 | End: 2023-04-25

## 2023-04-25 RX ORDER — HEPARIN SODIUM 5000 [USP'U]/ML
7500 INJECTION INTRAVENOUS; SUBCUTANEOUS EVERY 8 HOURS
Refills: 0 | Status: DISCONTINUED | OUTPATIENT
Start: 2023-04-25 | End: 2023-04-25

## 2023-04-25 RX ORDER — HEPARIN SODIUM 5000 [USP'U]/ML
5000 INJECTION INTRAVENOUS; SUBCUTANEOUS ONCE
Refills: 0 | Status: DISCONTINUED | OUTPATIENT
Start: 2023-04-25 | End: 2023-04-25

## 2023-04-25 RX ORDER — HYDROMORPHONE HYDROCHLORIDE 2 MG/ML
0.25 INJECTION INTRAMUSCULAR; INTRAVENOUS; SUBCUTANEOUS ONCE
Refills: 0 | Status: DISCONTINUED | OUTPATIENT
Start: 2023-04-25 | End: 2023-04-25

## 2023-04-25 RX ADMIN — SODIUM CHLORIDE 155 MILLILITER(S): 9 INJECTION, SOLUTION INTRAVENOUS at 19:26

## 2023-04-25 RX ADMIN — Medication 200 MILLIGRAM(S): at 18:21

## 2023-04-25 RX ADMIN — SODIUM CHLORIDE 1000 MILLILITER(S): 9 INJECTION INTRAMUSCULAR; INTRAVENOUS; SUBCUTANEOUS at 15:53

## 2023-04-25 RX ADMIN — HEPARIN SODIUM 7500 UNIT(S): 5000 INJECTION INTRAVENOUS; SUBCUTANEOUS at 22:47

## 2023-04-25 NOTE — PHYSICAL EXAM
[Respiratory Effort] : normal respiratory effort [Normal Rate and Rhythm] : normal rate and rhythm [Alert] : alert [Oriented to Person] : oriented to person [Oriented to Place] : oriented to place [Oriented to Time] : oriented to time [Calm] : calm [JVD] : no jugular venous distention  [de-identified] : appears well, no acute distress [de-identified] : no icterus [de-identified] : soft, resolving bruising at all incision sites. most lateral site clean but with some open skin in prior drain site, no active drainage.\par abd symmetric. minimally ttp RUQ with deep palpation neg woodward's. non-tender over area of pain in the back .

## 2023-04-25 NOTE — HISTORY OF PRESENT ILLNESS
[de-identified] : Ms. Shrestha underwent emergent lap robotic assisted cholecystectomy last week with Dr. Hauser. She reports that after discharge she initially had nearly no pain the first few days.  Overall energy level has been good.  Started last night around 10pm she had recurrence of some back pain and more mild RUQ pain, similar to her pre-op pain. She had some relief with extra strength Tylenol but then did also require oxycodone x 1. the pain is waxing and waning in severity, but not completely going away. She has been tolerating a regular diet since surgery and denies vomiting. She had water and tea this morning but then she had nausea x 1 early this morning. She reports regular bowel movements. She has not noticed any unusual color of her urine, skin or stool. She denies fevers and thinks she felt a brief chill with the nausea this am. At times in the past she has taken pepcid, however she has not tried any the past few days.

## 2023-04-25 NOTE — PATIENT PROFILE ADULT - FALL HARM RISK - UNIVERSAL INTERVENTIONS
Bed in lowest position, wheels locked, appropriate side rails in place/Call bell, personal items and telephone in reach/Instruct patient to call for assistance before getting out of bed or chair/Non-slip footwear when patient is out of bed/[place] to call system/Physically safe environment - no spills, clutter or unnecessary equipment/Purposeful Proactive Rounding/Room/bathroom lighting operational, light cord in reach

## 2023-04-25 NOTE — ED ADULT TRIAGE NOTE - CHIEF COMPLAINT QUOTE
RUQ abdominal pain with nausea, cholecystectomy 4/20, US today showed a gall stone in the duct, sent in for further evaluation.

## 2023-04-25 NOTE — ED ADULT NURSE NOTE - OBJECTIVE STATEMENT
42y Female A&OX3 to ED c/o right upper abd pain s/p cholecystectomy on 4/20. denies n/v/d, fever, chills, nor chough.

## 2023-04-25 NOTE — ASSESSMENT
[FreeTextEntry1] : Ms. Shrestha is a very pleasant 42 year old woman who underwent lap robotic assisted marbin for severely inflamed GB last week, where a drain was required and the cystic/GB neck border oversewn due to inflammation prohibiting lower dissection on cystic duct. \par \par Overall she appears well today. Her pain is concerning however she has stable vitals and no overt signs of systemic illness in the office today.  There are several possibilities for the source of her pain. \par The overall history and her exam make a biliary obstruction or post-op infection less likely.  She may be having pain related to ongoing inflammatory rind/hematoma in fossa but it is also possible she could be passing some residual sludge. There could also be a cystic stump or duct of lushka type of leak causing biloma.She also has hx pepcid use for prior UGI symptoms and gastric irritation could be contributing to the nausea.

## 2023-04-25 NOTE — ED PROVIDER NOTE - OBJECTIVE STATEMENT
42 F co abd pain- px is pod 5 s/p lap marbin - was doing well- then started getting abd pain RUQ   mild nausea chills crampy abd pain  no vomiting /diarrhea  had US outpx that showed stone in duct  mod severity  no jaundice/cholestasis

## 2023-04-25 NOTE — REASON FOR VISIT
[Follow-Up: _____] : a [unfilled] follow-up visit [FreeTextEntry1] : s/p lap robotic assisted cholecystectomy

## 2023-04-25 NOTE — H&P ADULT - HISTORY OF PRESENT ILLNESS
43yo Female pt with PMH no PMH and PSH of robotic assisted partial cholecystectomy. Patient is POD5 from RA- lap marbin performed for acute cholecystitis and has been recovering at home. She was evaluated in the office today with complaint of abdominal pain starting at 10pm last night. Pain was similar to preop pain, located in RUQ, intermittent, sharp a/w nausea with emesis. She has been tolerating PO intake, denies fevers, chills, chest pain, dyspnea, diarrhea. Has taken 1 oxycodone this morning without relief.     PMH: Cholelithaisis, MO  PSH: RA partial cholecystectomy (4/20: Murtaza)  Meds: None  All: NKDA  Social Hx: non smoker, no ETOH, no recreational drug use, works in [employer]  Family Hx: Denies family hx of IBS, Crohns, UC, or colon cancer.  Functional capacity: >4 METS    In the ED:   - Afebrile, nontachycardic, hypertensive, and satting well on RA   - Labs significant for: WBC 8.54 K/uL Hgb 13.5 g/dL BUN 8 mg/dL Cr 0.70 mg/dL. LFTs with mild elevation in ALT/AST, normal TB, normal Alp, Trace nit and + WBC on UA   - Imaging:   IMPRESSION:  Status post partial cholecystectomy. Filling defects in the proximal CBD,   most consistent with choledocholithiasis.      Physical Exam  General: AAOx3, NAD, laying comfortably in chair  Cardio: RRR  Pulm: non labored breathing  Abdomen: Soft, non distended, minimal RUQ tenderness, no rebound or guarding, incisions cdi   Extremities: WWP                          13.5   8.54  )-----------( 404      ( 25 Apr 2023 15:54 )             43.2     04-25    138  |  100  |  8   ----------------------------<  98  4.0   |  26  |  0.70    Ca    10.4      25 Apr 2023 15:54    TPro  7.3  /  Alb  4.2  /  TBili  0.3  /  DBili  x   /  AST  100<H>  /  ALT  68<H>  /  AlkPhos  71  04-25    LIVER FUNCTIONS - ( 25 Apr 2023 15:54 )  Alb: 4.2 g/dL / Pro: 7.3 g/dL / ALK PHOS: 71 U/L / ALT: 68 U/L / AST: 100 U/L / GGT: x           PT/INR - ( 25 Apr 2023 15:54 )   PT: 12.8 sec;   INR: 1.07          PTT - ( 25 Apr 2023 15:54 )  PTT:32.5 sec    ACC: [id number] EXAM:  US ABDOMEN COMPLETE   ORDERED BY: MINAL FERNANDEZ     PROCEDURE DATE:  04/25/2023          INTERPRETATION:  CLINICAL INFORMATION: Abdominal pain. Status post recent   partial cholecystectomy.    COMPARISON: Ultrasound 4/19/2023    TECHNIQUE: Sonography of the abdomen.    FINDINGS:  Liver: Within normal limits.  Bile ducts: Couple of filling defects within posterior acoustic shadowing   in the proximal CBD, each measuring about 1 cm, suspicious for   choledocholithiasis. Proximal CBD measures 1 cm in diameter.  Gallbladder: 4.5 x 2.4 cm hyperechoic thick-walled structure with fluid   in the central lumen likely representing residual portion of gallbladder.   No fluid collection is seen around the residual gallbladder.  Pancreas: Visualized portions are within normal limits.  Spleen: 9.3. Within normal limits.  Right kidney: 9.9 cm. No hydronephrosis.  Left kidney: 11 cm. No hydronephrosis. 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  Ascites: None.  Aorta and IVC: Visualized portions are within normal limits.    IMPRESSION:    Status post partial cholecystectomy. Filling defects in the proximal CBD,   most consistent with choledocholithiasis.    --- End of Report ---    IVÁN XAVIER MD; Attending Radiologist  This document has been electronically signed. Apr 25 2023  1:59PM         41yo Female pt with PMH no PMH and PSH of robotic assisted partial cholecystectomy. Patient is POD5 from RA- lap marbin performed for acute cholecystitis and has been recovering at home. She was evaluated in the office today with complaint of abdominal pain starting at 10pm last night. Pain was similar to preop pain, located in RUQ, intermittent, sharp a/w nausea with emesis. She has been tolerating PO intake, denies fevers, chills, chest pain, dyspnea, diarrhea. Has taken 1 oxycodone this morning without relief.     PMH: Cholelithaisis, [place]  PSH: RA partial cholecystectomy (4/20: Murtaza)  Meds: None  All: NKDA  Social Hx: non smoker, no ETOH, no recreational drug use, works in [employer]  Family Hx: Denies family hx of IBS, Crohns, UC, or colon cancer.  Functional capacity: >4 METS    In the ED:   - Afebrile, nontachycardic, hypertensive, and satting well on RA   - Labs significant for: WBC 8.54 K/uL Hgb 13.5 g/dL BUN 8 mg/dL Cr 0.70 mg/dL. LFTs with mild elevation in ALT/AST, normal TB, normal Alp, Trace nit and + WBC on UA   - Imaging:   IMPRESSION:  Status post partial cholecystectomy. Filling defects in the proximal CBD,   most consistent with choledocholithiasis.      Physical Exam  General: AAOx3, NAD, laying comfortably in chair  Cardio: RRR  Pulm: non labored breathing  Abdomen: Soft, non distended, minimal RUQ tenderness, no rebound or guarding, incisions cdi   Extremities: WWP                          13.5   8.54  )-----------( 404      ( 25 Apr 2023 15:54 )             43.2     04-25    138  |  100  |  8   ----------------------------<  98  4.0   |  26  |  0.70    Ca    10.4      25 Apr 2023 15:54    TPro  7.3  /  Alb  4.2  /  TBili  0.3  /  DBili  x   /  AST  100<H>  /  ALT  68<H>  /  AlkPhos  71  04-25    LIVER FUNCTIONS - ( 25 Apr 2023 15:54 )  Alb: 4.2 g/dL / Pro: 7.3 g/dL / ALK PHOS: 71 U/L / ALT: 68 U/L / AST: 100 U/L / GGT: x           PT/INR - ( 25 Apr 2023 15:54 )   PT: 12.8 sec;   INR: 1.07          PTT - ( 25 Apr 2023 15:54 )  PTT:32.5 sec    ACC: [id number] EXAM:  US ABDOMEN COMPLETE   ORDERED BY: MINAL FERNANDEZ     PROCEDURE DATE:  04/25/2023          INTERPRETATION:  CLINICAL INFORMATION: Abdominal pain. Status post recent   partial cholecystectomy.    COMPARISON: Ultrasound 4/19/2023    TECHNIQUE: Sonography of the abdomen.    FINDINGS:  Liver: Within normal limits.  Bile ducts: Couple of filling defects within posterior acoustic shadowing   in the proximal CBD, each measuring about 1 cm, suspicious for   choledocholithiasis. Proximal CBD measures 1 cm in diameter.  Gallbladder: 4.5 x 2.4 cm hyperechoic thick-walled structure with fluid   in the central lumen likely representing residual portion of gallbladder.   No fluid collection is seen around the residual gallbladder.  Pancreas: Visualized portions are within normal limits.  Spleen: 9.3. Within normal limits.  Right kidney: 9.9 cm. No hydronephrosis.  Left kidney: 11 cm. No hydronephrosis. 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  Ascites: None.  Aorta and IVC: Visualized portions are within normal limits.    IMPRESSION:    Status post partial cholecystectomy. Filling defects in the proximal CBD,   most consistent with choledocholithiasis.    --- End of Report ---    IVÁN XAVIER MD; Attending Radiologist  This document has been electronically signed. Apr 25 2023  1:59PM

## 2023-04-25 NOTE — PLAN
[FreeTextEntry1] : Reviewed options and my recommendations with the patient and her father.\par She will have labwork today to better determine if there are any signs of inflammation, infection or biliary obstruction. She will also have an u/s. I did caution them that some fluid and inflammatory material in the fossa on u/s is not always an indication of an ongoing leak and we may need additional testing if that is seen. \par She will also add in NSAIDs and PPI for symptoms control. \par Will f/u after labs and u/w are complete to determine if any further testing is needed vs observation at home vs ED visit.

## 2023-04-25 NOTE — H&P ADULT - ASSESSMENT
43yo female with MO and POD 5 from RA- partial cholecystectomy presents to emergency with abdominal pain. Afebrile, non tachycardic, hypertensive, abdomen soft with minimal RUQ tenderness, no rebound. Labs demonstrate normal WBC, normal TB, slightly elevated AST/ALT, trace Nit with WBC on UA. RUQ u/s demonstrating partial cholecystectomy, with increased CBD diameter to 1cm with posterior shadowing. Differential includes choledocholithaisis, no evidence of cholangitis.     - Admit to regional  - CLD, NPO mn/IVF  - Abx: Cefpodoxime for UTI  - Pain and Nausea control  - SCD/SQH x1 dose hold for potential GI procedure  - IS/OOB  - AM labs

## 2023-04-26 DIAGNOSIS — K80.00 CALCULUS OF GALLBLADDER WITH ACUTE CHOLECYSTITIS WITHOUT OBSTRUCTION: ICD-10-CM

## 2023-04-26 DIAGNOSIS — K81.0 ACUTE CHOLECYSTITIS: ICD-10-CM

## 2023-04-26 DIAGNOSIS — K66.0 PERITONEAL ADHESIONS (POSTPROCEDURAL) (POSTINFECTION): ICD-10-CM

## 2023-04-26 DIAGNOSIS — E66.01 MORBID (SEVERE) OBESITY DUE TO EXCESS CALORIES: ICD-10-CM

## 2023-04-26 DIAGNOSIS — K21.9 GASTRO-ESOPHAGEAL REFLUX DISEASE WITHOUT ESOPHAGITIS: ICD-10-CM

## 2023-04-26 LAB
ALBUMIN SERPL ELPH-MCNC: 3.4 G/DL — SIGNIFICANT CHANGE UP (ref 3.3–5)
ALP SERPL-CCNC: 65 U/L — SIGNIFICANT CHANGE UP (ref 40–120)
ALT FLD-CCNC: 55 U/L — HIGH (ref 10–45)
ANION GAP SERPL CALC-SCNC: 8 MMOL/L — SIGNIFICANT CHANGE UP (ref 5–17)
APTT BLD: 33.7 SEC — SIGNIFICANT CHANGE UP (ref 27.5–35.5)
AST SERPL-CCNC: 67 U/L — HIGH (ref 10–40)
BASOPHILS # BLD AUTO: 0.09 K/UL — SIGNIFICANT CHANGE UP (ref 0–0.2)
BASOPHILS NFR BLD AUTO: 1.1 % — SIGNIFICANT CHANGE UP (ref 0–2)
BILIRUB DIRECT SERPL-MCNC: <0.2 MG/DL — SIGNIFICANT CHANGE UP (ref 0–0.3)
BILIRUB INDIRECT FLD-MCNC: SIGNIFICANT CHANGE UP MG/DL (ref 0.2–1)
BILIRUB SERPL-MCNC: 0.4 MG/DL — SIGNIFICANT CHANGE UP (ref 0.2–1.2)
BLD GP AB SCN SERPL QL: NEGATIVE — SIGNIFICANT CHANGE UP
BUN SERPL-MCNC: 7 MG/DL — SIGNIFICANT CHANGE UP (ref 7–23)
CALCIUM SERPL-MCNC: 9.6 MG/DL — SIGNIFICANT CHANGE UP (ref 8.4–10.5)
CHLORIDE SERPL-SCNC: 104 MMOL/L — SIGNIFICANT CHANGE UP (ref 96–108)
CO2 SERPL-SCNC: 25 MMOL/L — SIGNIFICANT CHANGE UP (ref 22–31)
CREAT SERPL-MCNC: 0.79 MG/DL — SIGNIFICANT CHANGE UP (ref 0.5–1.3)
EGFR: 95 ML/MIN/1.73M2 — SIGNIFICANT CHANGE UP
EOSINOPHIL # BLD AUTO: 0.42 K/UL — SIGNIFICANT CHANGE UP (ref 0–0.5)
EOSINOPHIL NFR BLD AUTO: 5.2 % — SIGNIFICANT CHANGE UP (ref 0–6)
GLUCOSE SERPL-MCNC: 98 MG/DL — SIGNIFICANT CHANGE UP (ref 70–99)
HCT VFR BLD CALC: 38.9 % — SIGNIFICANT CHANGE UP (ref 34.5–45)
HGB BLD-MCNC: 12.2 G/DL — SIGNIFICANT CHANGE UP (ref 11.5–15.5)
IMM GRANULOCYTES NFR BLD AUTO: 0.2 % — SIGNIFICANT CHANGE UP (ref 0–0.9)
INR BLD: 1.12 — SIGNIFICANT CHANGE UP (ref 0.88–1.16)
LYMPHOCYTES # BLD AUTO: 2.64 K/UL — SIGNIFICANT CHANGE UP (ref 1–3.3)
LYMPHOCYTES # BLD AUTO: 32.5 % — SIGNIFICANT CHANGE UP (ref 13–44)
MAGNESIUM SERPL-MCNC: 2 MG/DL — SIGNIFICANT CHANGE UP (ref 1.6–2.6)
MCHC RBC-ENTMCNC: 29.9 PG — SIGNIFICANT CHANGE UP (ref 27–34)
MCHC RBC-ENTMCNC: 31.4 GM/DL — LOW (ref 32–36)
MCV RBC AUTO: 95.3 FL — SIGNIFICANT CHANGE UP (ref 80–100)
MONOCYTES # BLD AUTO: 0.8 K/UL — SIGNIFICANT CHANGE UP (ref 0–0.9)
MONOCYTES NFR BLD AUTO: 9.9 % — SIGNIFICANT CHANGE UP (ref 2–14)
NEUTROPHILS # BLD AUTO: 4.15 K/UL — SIGNIFICANT CHANGE UP (ref 1.8–7.4)
NEUTROPHILS NFR BLD AUTO: 51.1 % — SIGNIFICANT CHANGE UP (ref 43–77)
NRBC # BLD: 0 /100 WBCS — SIGNIFICANT CHANGE UP (ref 0–0)
PHOSPHATE SERPL-MCNC: 4.2 MG/DL — SIGNIFICANT CHANGE UP (ref 2.5–4.5)
PLATELET # BLD AUTO: 349 K/UL — SIGNIFICANT CHANGE UP (ref 150–400)
POTASSIUM SERPL-MCNC: 4.9 MMOL/L — SIGNIFICANT CHANGE UP (ref 3.5–5.3)
POTASSIUM SERPL-SCNC: 4.9 MMOL/L — SIGNIFICANT CHANGE UP (ref 3.5–5.3)
PROT SERPL-MCNC: 6.5 G/DL — SIGNIFICANT CHANGE UP (ref 6–8.3)
PROTHROM AB SERPL-ACNC: 13.4 SEC — SIGNIFICANT CHANGE UP (ref 10.5–13.4)
RBC # BLD: 4.08 M/UL — SIGNIFICANT CHANGE UP (ref 3.8–5.2)
RBC # FLD: 13.2 % — SIGNIFICANT CHANGE UP (ref 10.3–14.5)
RH IG SCN BLD-IMP: POSITIVE — SIGNIFICANT CHANGE UP
SODIUM SERPL-SCNC: 137 MMOL/L — SIGNIFICANT CHANGE UP (ref 135–145)
WBC # BLD: 8.12 K/UL — SIGNIFICANT CHANGE UP (ref 3.8–10.5)
WBC # FLD AUTO: 8.12 K/UL — SIGNIFICANT CHANGE UP (ref 3.8–10.5)

## 2023-04-26 PROCEDURE — 43264 ERCP REMOVE DUCT CALCULI: CPT | Mod: 59

## 2023-04-26 PROCEDURE — 99222 1ST HOSP IP/OBS MODERATE 55: CPT | Mod: 25

## 2023-04-26 PROCEDURE — 99231 SBSQ HOSP IP/OBS SF/LOW 25: CPT | Mod: 24

## 2023-04-26 PROCEDURE — 74328 X-RAY BILE DUCT ENDOSCOPY: CPT | Mod: 26

## 2023-04-26 PROCEDURE — 99222 1ST HOSP IP/OBS MODERATE 55: CPT

## 2023-04-26 PROCEDURE — 43274 ERCP DUCT STENT PLACEMENT: CPT | Mod: 59

## 2023-04-26 DEVICE — STONE BASKET TETRACATCH V ROTATABLE 4-WIRE 2.8MM: Type: IMPLANTABLE DEVICE | Status: FUNCTIONAL

## 2023-04-26 DEVICE — HYDRATOME 44: Type: IMPLANTABLE DEVICE | Status: FUNCTIONAL

## 2023-04-26 DEVICE — BLLN EXTRCTR PRO RX-S 9-12MM: Type: IMPLANTABLE DEVICE | Status: FUNCTIONAL

## 2023-04-26 DEVICE — STENT BIL PUSH CATH 7FRX170CM: Type: IMPLANTABLE DEVICE | Status: FUNCTIONAL

## 2023-04-26 DEVICE — STENT PANCREAS CATH PUSH 5FRX170CM: Type: IMPLANTABLE DEVICE | Status: FUNCTIONAL

## 2023-04-26 DEVICE — STENT PANC ZIMMON 5FRX6CM: Type: IMPLANTABLE DEVICE | Status: FUNCTIONAL

## 2023-04-26 DEVICE — IMPLANTABLE DEVICE: Type: IMPLANTABLE DEVICE | Status: FUNCTIONAL

## 2023-04-26 DEVICE — HYDRA WIRE: Type: IMPLANTABLE DEVICE | Status: FUNCTIONAL

## 2023-04-26 RX ORDER — ACETAMINOPHEN 500 MG
1000 TABLET ORAL ONCE
Refills: 0 | Status: COMPLETED | OUTPATIENT
Start: 2023-04-26 | End: 2023-04-26

## 2023-04-26 RX ORDER — HYDROMORPHONE HYDROCHLORIDE 2 MG/ML
0.25 INJECTION INTRAMUSCULAR; INTRAVENOUS; SUBCUTANEOUS EVERY 6 HOURS
Refills: 0 | Status: DISCONTINUED | OUTPATIENT
Start: 2023-04-26 | End: 2023-04-26

## 2023-04-26 RX ORDER — HYDROMORPHONE HYDROCHLORIDE 2 MG/ML
0.5 INJECTION INTRAMUSCULAR; INTRAVENOUS; SUBCUTANEOUS
Refills: 0 | Status: DISCONTINUED | OUTPATIENT
Start: 2023-04-26 | End: 2023-04-27

## 2023-04-26 RX ORDER — ONDANSETRON 8 MG/1
4 TABLET, FILM COATED ORAL EVERY 6 HOURS
Refills: 0 | Status: DISCONTINUED | OUTPATIENT
Start: 2023-04-26 | End: 2023-04-29

## 2023-04-26 RX ADMIN — HYDROMORPHONE HYDROCHLORIDE 0.5 MILLIGRAM(S): 2 INJECTION INTRAMUSCULAR; INTRAVENOUS; SUBCUTANEOUS at 23:28

## 2023-04-26 RX ADMIN — Medication 200 MILLIGRAM(S): at 18:05

## 2023-04-26 RX ADMIN — ONDANSETRON 4 MILLIGRAM(S): 8 TABLET, FILM COATED ORAL at 23:27

## 2023-04-26 RX ADMIN — Medication 200 MILLIGRAM(S): at 05:24

## 2023-04-26 RX ADMIN — Medication 1000 MILLIGRAM(S): at 03:16

## 2023-04-26 RX ADMIN — Medication 400 MILLIGRAM(S): at 02:46

## 2023-04-26 RX ADMIN — HYDROMORPHONE HYDROCHLORIDE 0.5 MILLIGRAM(S): 2 INJECTION INTRAMUSCULAR; INTRAVENOUS; SUBCUTANEOUS at 23:41

## 2023-04-26 RX ADMIN — Medication 400 MILLIGRAM(S): at 17:59

## 2023-04-26 NOTE — CONSULT NOTE ADULT - ASSESSMENT
42 year old female with hx of gerd and hx of fibroids, was recently admitted at [facility] and had a partial cholecystectomy 4/20 presents to ED with abdominal pain. RUQ u/s demonstrating partial cholecystectomy, with increased CBD diameter to 1cm with posterior shadowing    #suspected choledocholithiasis   #post operative state   US as above   hx of cholecystitis and is s/p RA cholecystectomy (4/20)  OOTB to chair   Encourage ambulation ; Encourage incentive spirometry   pending ERCP   rest of the management per primary team    #UTI   UA positive   Cefpodoxime for UTI       #Gerd   can do Pepcid if symptoms reoccur     #dvp ppx scd,    42 year old female with hx of gerd and hx of fibroids, was recently admitted at [facility] and had a partial cholecystectomy 4/20 presents to ED with abdominal pain. RUQ u/s demonstrating partial cholecystectomy, with increased CBD diameter to 1cm with posterior shadowing    #suspected choledocholithiasis   #post operative state   #transaminitis   US as above   hx of cholecystitis and is s/p RA partial cholecystectomy (4/20)  OOTB to chair   Encourage ambulation ; Encourage incentive spirometry   pending ERCP   LFT's trending down from 100/68--67/55  rest of the management per primary team    #UTI   UA positive   Cefpodoxime for UTI       #Gerd   can do Pepcid if symptoms reoccur     #dvp ppx scd,

## 2023-04-26 NOTE — PRE-ANESTHESIA EVALUATION ADULT - NSANTHPMHFT_GEN_ALL_CORE
General: Obesity, BMI > 40  Cardiac: Denies HTN, HLD, MI/Angina/Heart Failure, Arrhythmia, Murmur/Valvular Disorder. >4 METS  Pulmonary: Denies Asthma, COPD, CIRO  Renal: Denies kidney dysfunction  Hepatic: Positive for choledocholithiasis following cholecystectomy. Denies liver dysfunction at baseline.  Gastrointestinal: Denies GERD/IBD  Endocrine: Denies DM or thyroid dysfunction  Neurologic: Denies stroke/seizure disorder  Hematologic: Denies anemia, blood clotting disorder, blood thinning medication.    PSH: Uterine fibroid removal via vaginal hysteroscopy, Cholecystectomy.

## 2023-04-26 NOTE — PRE-ANESTHESIA EVALUATION ADULT - HEART RATE (BEATS/MIN)
Medical Grade Compression Hose     Patient: Mickie Price     YOB: 1953         Diagnosis:  Varicose Veins Bilateral Lower extremities with pain, inflammation and discoloration.     Compression: 20-30mmHg- Moderate  Style: Waist        Amount: X 2  HCPS: - Waist          Physician Signature: _____________________  Date: 5/7/2025     SHALONDA Jaeger                                 64

## 2023-04-26 NOTE — CONSULT NOTE ADULT - SUBJECTIVE AND OBJECTIVE BOX
GASTROENTEROLOGY CONSULT NOTE  HPI: 43yo Female pt with PMH no PMH and PSH of robotic assisted partial cholecystectomy. Patient is POD5 from RA- lap marbin performed for acute cholecystitis and has been recovering at home. She was evaluated in the office today with complaint of abdominal pain starting at 10pm last night. Pain was similar to preop pain, located in RUQ, intermittent, sharp a/w nausea with emesis. She has been tolerating PO intake, denies fevers, chills, chest pain, dyspnea, diarrhea. Has taken 1 oxycodone this morning without relief.     PMH: Cholelithaisis, [place]  PSH: RA partial cholecystectomy (4/20: Murtaza)  Meds: None  All: NKDA  Social Hx: non smoker, no ETOH, no recreational drug use, works in [employer]  Family Hx: Denies family hx of IBS, Crohns, UC, or colon cancer.  Functional capacity: >4 METS    In the ED:   - Afebrile, nontachycardic, hypertensive, and satting well on RA   - Labs significant for: WBC 8.54 K/uL Hgb 13.5 g/dL BUN 8 mg/dL Cr 0.70 mg/dL. LFTs with mild elevation in ALT/AST, normal TB, normal Alp, Trace nit and + WBC on UA   - Imaging:   IMPRESSION:  Status post partial cholecystectomy. Filling defects in the proximal CBD,   most consistent with choledocholithiasis.   (25 Apr 2023 18:02)    GI consulted for choledocholithiasis. Patient seen and examined at bedside.     Allergies    No Known Allergies    Intolerances      Home Medications:    MEDICATIONS:  MEDICATIONS  (STANDING):  cefpodoxime 200 milliGRAM(s) Oral every 12 hours  lactated ringers. 1000 milliLiter(s) (155 mL/Hr) IV Continuous <Continuous>  ondansetron Injectable 4 milliGRAM(s) IV Push every 6 hours    MEDICATIONS  (PRN):  HYDROmorphone  Injectable 0.25 milliGRAM(s) IV Push every 6 hours PRN Severe Pain (7 - 10)    PAST MEDICAL & SURGICAL HISTORY:  No pertinent past medical history      No significant past surgical history        FAMILY HISTORY:    SOCIAL HISTORY:  Tobacco: [ ] Current, [ ] Former, [ ] Never; Pack Years:  Alcohol:  Illicit Drugs:    REVIEW OF SYSTEMS:  CONSTITUTIONAL: No weakness, fevers or chills  HEENT: No visual changes; No vertigo or throat pain   NECK: No pain or stiffness  RESPIRATORY: No cough, wheezing, hemoptysis; No shortness of breath  CARDIOVASCULAR: No chest pain or palpitations  GASTROINTESTINAL: As above.  GENITOURINARY: No dysuria, frequency or hematuria  NEUROLOGICAL: No numbness or weakness  SKIN: No itching, burning, rashes, or lesions   All other 10 review of systems is negative unless indicated above.    Vital Signs Last 24 Hrs  T(C): 36.7 (26 Apr 2023 12:40), Max: 37.3 (26 Apr 2023 09:09)  T(F): 98 (26 Apr 2023 12:40), Max: 99.2 (26 Apr 2023 09:09)  HR: 65 (26 Apr 2023 12:40) (58 - 77)  BP: 121/72 (26 Apr 2023 12:40) (101/66 - 147/89)  BP(mean): --  RR: 17 (26 Apr 2023 12:40) (16 - 18)  SpO2: 98% (26 Apr 2023 12:40) (97% - 100%)    Parameters below as of 26 Apr 2023 12:40  Patient On (Oxygen Delivery Method): room air        04-25 @ 07:01 - 04-26 @ 07:00  --------------------------------------------------------  IN: 1395 mL / OUT: 550 mL / NET: 845 mL    04-26 @ 07:01 - 04-26 @ 14:47  --------------------------------------------------------  IN: 1170 mL / OUT: 800 mL / NET: 370 mL        PHYSICAL EXAM:    General: in no acute distress  Eyes: Anicteric sclerae, moist conjunctivae  HENT: Moist mucous membranes  Neck: Trachea midline, supple  Lungs: Normal respiratory effort, no intercostal retractions  Cardiovascular: RRR  Abdomen: Soft, non-tender non-distended; No rebound or guarding  Extremities: Normal range of motion, No clubbing, cyanosis or edema  Neurological: Alert and oriented x3  Skin: Warm and dry. No obvious rash    LABS:                        12.2   8.12  )-----------( 349      ( 26 Apr 2023 05:52 )             38.9     04-26    137  |  104  |  7   ----------------------------<  98  4.9   |  25  |  0.79    Ca    9.6      26 Apr 2023 05:52  Phos  4.2     04-26  Mg     2.0     04-26    TPro  6.5  /  Alb  3.4  /  TBili  0.4  /  DBili  <0.2  /  AST  67<H>  /  ALT  55<H>  /  AlkPhos  65  04-26        PT/INR - ( 26 Apr 2023 05:52 )   PT: 13.4 sec;   INR: 1.12          PTT - ( 26 Apr 2023 05:52 )  PTT:33.7 sec    RADIOLOGY & ADDITIONAL STUDIES:     Reviewed
Patient is a 42y old  Female who presents with a chief complaint of     HPI:    43yo Female pt with PMH no PMH and PSH of robotic assisted partial cholecystectomy. Patient is POD5 from RA- lap marbin performed for acute cholecystitis and has been recovering at home. She was evaluated in the office today with complaint of abdominal pain starting at 10pm last night. Pain was similar to preop pain, located in RUQ, intermittent, sharp a/w nausea with emesis. She has been tolerating PO intake, denies fevers, chills, chest pain, dyspnea, diarrhea. Has taken 1 oxycodone this morning without relief.     PMH: Cholelithaisis, [place]  PSH: RA partial cholecystectomy (: Murtaza)  Meds: None  All: NKDA  Social Hx: non smoker, no ETOH, no recreational drug use, works in [employer]  Family Hx: Denies family hx of IBS, Crohns, UC, or colon cancer.  Functional capacity: >4 METS    In the ED:   - Afebrile, nontachycardic, hypertensive, and satting well on RA   - Labs significant for: WBC 8.54 K/uL Hgb 13.5 g/dL BUN 8 mg/dL Cr 0.70 mg/dL. LFTs with mild elevation in ALT/AST, normal TB, normal Alp, Trace nit and + WBC on UA   - Imaging:   IMPRESSION:  Status post partial cholecystectomy. Filling defects in the proximal CBD,   most consistent with choledocholithiasis.      Physical Exam  General: AAOx3, NAD, laying comfortably in chair  Cardio: RRR  Pulm: non labored breathing  Abdomen: Soft, non distended, minimal RUQ tenderness, no rebound or guarding, incisions cdi   Extremities: WWP                          13.5   8.54  )-----------( 404      ( 2023 15:54 )             43.2     04-    138  |  100  |  8   ----------------------------<  98  4.0   |  26  |  0.70    Ca    10.4      2023 15:54    TPro  7.3  /  Alb  4.2  /  TBili  0.3  /  DBili  x   /  AST  100<H>  /  ALT  68<H>  /  AlkPhos  71  04-25    LIVER FUNCTIONS - ( 2023 15:54 )  Alb: 4.2 g/dL / Pro: 7.3 g/dL / ALK PHOS: 71 U/L / ALT: 68 U/L / AST: 100 U/L / GGT: x           PT/INR - ( 2023 15:54 )   PT: 12.8 sec;   INR: 1.07          PTT - ( 2023 15:54 )  PTT:32.5 sec    ACC: [id number] EXAM:  US ABDOMEN COMPLETE   ORDERED BY: MINAL FERNANDEZ     PROCEDURE DATE:  2023          INTERPRETATION:  CLINICAL INFORMATION: Abdominal pain. Status post recent   partial cholecystectomy.    COMPARISON: Ultrasound 2023    TECHNIQUE: Sonography of the abdomen.    FINDINGS:  Liver: Within normal limits.  Bile ducts: Couple of filling defects within posterior acoustic shadowing   in the proximal CBD, each measuring about 1 cm, suspicious for   choledocholithiasis. Proximal CBD measures 1 cm in diameter.  Gallbladder: 4.5 x 2.4 cm hyperechoic thick-walled structure with fluid   in the central lumen likely representing residual portion of gallbladder.   No fluid collection is seen around the residual gallbladder.  Pancreas: Visualized portions are within normal limits.  Spleen: 9.3. Within normal limits.  Right kidney: 9.9 cm. No hydronephrosis.  Left kidney: 11 cm. No hydronephrosis. 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  Ascites: None.  Aorta and IVC: Visualized portions are within normal limits.    IMPRESSION:    Status post partial cholecystectomy. Filling defects in the proximal CBD,   most consistent with choledocholithiasis.    --- End of Report ---    IVÁN XAVIER MD; Attending Radiologist  This document has been electronically signed. 2023  1:59PM       (2023 18:02)      Allergies    No Known Allergies    Intolerances        MEDICATIONS  (STANDING):  cefpodoxime 200 milliGRAM(s) Oral every 12 hours  lactated ringers. 1000 milliLiter(s) (155 mL/Hr) IV Continuous <Continuous>  ondansetron Injectable 4 milliGRAM(s) IV Push every 6 hours    MEDICATIONS  (PRN):  HYDROmorphone  Injectable 0.25 milliGRAM(s) IV Push every 6 hours PRN Severe Pain (7 - 10)      Daily Height in cm: 154.94 (2023 14:52)    Daily     Drug Dosing Weight  Height (cm): 154.9 (2023 14:52)  Weight (kg): 117 (2023 14:52)  BMI (kg/m2): 48.8 (2023 14:52)  BSA (m2): 2.1 (2023 14:52)    PAST MEDICAL & SURGICAL HISTORY:  No pertinent past medical history      No significant past surgical history          FAMILY HISTORY:        REVIEW OF SYSTEMS:    CONSTITUTIONAL: No fever, weight loss, or fatigue  EYES: No eye pain, visual disturbances, or discharge  ENMT:  No difficulty hearing, tinnitus, vertigo; No sinus or throat pain  NECK: No pain or stiffness  RESPIRATORY: No cough, wheezing, chills or hemoptysis; No shortness of breath  CARDIOVASCULAR: No chest pain, palpitations, dizziness, or leg swelling  GASTROINTESTINAL: No abdominal or epigastric pain. No nausea, vomiting, or hematemesis; No diarrhea or constipation. No melena or hematochezia.  GENITOURINARY: No dysuria, frequency, hematuria, or incontinence  LYMPH NODES: No enlarged glands  ENDOCRINE: No heat or cold intolerance; No hair loss  MUSCULOSKELETAL: No joint pain or swelling; No muscle, back, or extremity pain  NEUROLOGICAL: No headaches, memory loss, loss of strength, numbness, or tremors  SKIN: No itching, burning, rashes, or lesion              ICU Vital Signs Last 24 Hrs  T(C): 37.3 (2023 09:09), Max: 37.3 (2023 09:09)  T(F): 99.2 (2023 09:09), Max: 99.2 (2023 09:09)  HR: 58 (2023 09:09) (58 - 77)  BP: 101/66 (2023 09:09) (101/66 - 147/89)  BP(mean): --  ABP: --  ABP(mean): --  RR: 17 (2023 09:09) (16 - 18)  SpO2: 97% (2023 09:09) (97% - 100%)    O2 Parameters below as of 2023 09:09  Patient On (Oxygen Delivery Method): room air                I&O's Detail    2023 07:01  -  2023 07:00  --------------------------------------------------------  IN:    Lactated Ringers: 1395 mL  Total IN: 1395 mL    OUT:    Voided (mL): 550 mL  Total OUT: 550 mL    Total NET: 845 mL          PHYSICAL EXAM:    GENERAL: NAD, well-groomed, well-developed  HEAD:  Atraumatic, Normocephalic  EYES: EOMI, PERRLA, conjunctiva and sclera clear  ENMT: No tonsillar erythema, exudates, or enlargement; Moist mucous membranes, Good dentition, No lesions  NECK: Supple, No JVD, Normal thyroid  NERVOUS SYSTEM:  Alert & Oriented X3, Good concentration; Motor Strength 5/5 B/L upper and lower extremities; DTRs 2+ intact and symmetric  CHEST/LUNG: Clear to percussion bilaterally; No rales, rhonchi, wheezing, or rubs  HEART: Regular rate and rhythm; No murmurs, rubs, or gallops  ABDOMEN: Soft, TTP in RUQ, Nondistended; Bowel sounds present, incision clean and dry   EXTREMITIES:  2+ Peripheral Pulses, No clubbing, cyanosis, or edema  LYMPH: No lymphadenopathy noted  SKIN: No rashes or lesions    LABS:  CBC Full  -  ( 2023 05:52 )  WBC Count : 8.12 K/uL  RBC Count : 4.08 M/uL  Hemoglobin : 12.2 g/dL  Hematocrit : 38.9 %  Platelet Count - Automated : 349 K/uL  Mean Cell Volume : 95.3 fl  Mean Cell Hemoglobin : 29.9 pg  Mean Cell Hemoglobin Concentration : 31.4 gm/dL  Auto Neutrophil # : 4.15 K/uL  Auto Lymphocyte # : 2.64 K/uL  Auto Monocyte # : 0.80 K/uL  Auto Eosinophil # : 0.42 K/uL  Auto Basophil # : 0.09 K/uL  Auto Neutrophil % : 51.1 %  Auto Lymphocyte % : 32.5 %  Auto Monocyte % : 9.9 %  Auto Eosinophil % : 5.2 %  Auto Basophil % : 1.1 %        137  |  104  |  7   ----------------------------<  98  4.9   |  25  |  0.79    Ca    9.6      2023 05:52  Phos  4.2     -  Mg     2.0         TPro  6.5  /  Alb  3.4  /  TBili  0.4  /  DBili  <0.2  /  AST  67<H>  /  ALT  55<H>  /  AlkPhos  65  -    CAPILLARY BLOOD GLUCOSE        PT/INR - ( 2023 05:52 )   PT: 13.4 sec;   INR: 1.12          PTT - ( 2023 05:52 )  PTT:33.7 sec  Urinalysis Basic - ( 2023 15:55 )    Color: Yellow / Appearance: Clear / S.025 / pH: x  Gluc: x / Ketone: Trace mg/dL  / Bili: Negative / Urobili: 0.2 E.U./dL   Blood: x / Protein: NEGATIVE mg/dL / Nitrite: NEGATIVE   Leuk Esterase: Trace / RBC: > 10 /HPF / WBC > 10 /HPF   Sq Epi: x / Non Sq Epi: x / Bacteria: Present /HPF              EKG:    ECHO, US:    RADIOLOGY:

## 2023-04-26 NOTE — CHART NOTE - NSCHARTNOTEFT_GEN_A_CORE
Patient is s/p ERCP with Dr. Almaguer in the Endoscopy Suite with the following findings and recommendations.     Impressions:  - The duodenoscope was passed into the second portion of the duodenum. The major papilla was identified and appeared unremarkable. Using a short tipped traction sphincterotome, the bile duct was readily cannulated. A wire was first passed and coursed into the pancreatic duct, and was kept in place. A second wire was passed into the bile duct, and a cholangiogram was produced by injecting contrast. There were multiple proximal CBD stones visualized. The mid and distal duct did not appear dilated. Using a pulsed cut setting, a sphincterotomy was performed without complications. A balloon was used to perform a sweep of the bile duct. Minimal debris and sludge was removed. On cholangiogram, the proximal stones were unable to be sweeped down. The stone retrieval basket was also attempted in order to collect the proximal stones, but was unsuccessful. Using the previously placed pancreatic access wire, a 5Fr x 6cm single pigtail pancreatic stent was placed into the bile duct. Using the previously placed biliary access wire, a 7Fr x 7cm double pigtail stent was placed into the bile duct.    Recommendations:  - Clear Liquid Diet  - Return to floor for further management  - ERCP in 8 weeks    My Johnson MD  PGY-6, Gastroenterology Fellow  pager: [phone]

## 2023-04-26 NOTE — CONSULT NOTE ADULT - ASSESSMENT
41yo Female pt with PMH no PMH and PSH of robotic assisted partial cholecystectomy. Patient is POD5 from RA- lap marbin performed for acute cholecystitis and has been recovering at home. She was evaluated in the office today with complaint of abdominal pain starting at 10pm last night. Pain was similar to preop pain, located in RUQ, intermittent, sharp a/w nausea with emesis. She has been tolerating PO intake, denies fevers, chills, chest pain, dyspnea, diarrhea. Has taken 1 oxycodone this morning without relief. GI consulted for choledocholithiasis.     #Choledocholithiasis  - personally reviewed imaging with proximal CBD stones  - plan for ERCP today  - please keep NPO     My Johnson MD  PGY-6, Gastroenterology Fellow  pager: [phone]

## 2023-04-26 NOTE — PRE-ANESTHESIA EVALUATION ADULT - NSANTHOSAYNRD_GEN_A_CORE
No. CIRO screening performed.  STOP BANG Legend: 0-2 = LOW Risk; 3-4 = INTERMEDIATE Risk; 5-8 = HIGH Risk

## 2023-04-27 ENCOUNTER — TRANSCRIPTION ENCOUNTER (OUTPATIENT)
Age: 43
End: 2023-04-27

## 2023-04-27 LAB
ALBUMIN SERPL ELPH-MCNC: 3.6 G/DL — SIGNIFICANT CHANGE UP (ref 3.3–5)
ALP SERPL-CCNC: 64 U/L — SIGNIFICANT CHANGE UP (ref 40–120)
ALT FLD-CCNC: 41 U/L — SIGNIFICANT CHANGE UP (ref 10–45)
ANION GAP SERPL CALC-SCNC: 9 MMOL/L — SIGNIFICANT CHANGE UP (ref 5–17)
AST SERPL-CCNC: 35 U/L — SIGNIFICANT CHANGE UP (ref 10–40)
BILIRUB SERPL-MCNC: 0.3 MG/DL — SIGNIFICANT CHANGE UP (ref 0.2–1.2)
BUN SERPL-MCNC: 5 MG/DL — LOW (ref 7–23)
CALCIUM SERPL-MCNC: 9.4 MG/DL — SIGNIFICANT CHANGE UP (ref 8.4–10.5)
CHLORIDE SERPL-SCNC: 100 MMOL/L — SIGNIFICANT CHANGE UP (ref 96–108)
CO2 SERPL-SCNC: 26 MMOL/L — SIGNIFICANT CHANGE UP (ref 22–31)
CREAT SERPL-MCNC: 0.59 MG/DL — SIGNIFICANT CHANGE UP (ref 0.5–1.3)
EGFR: 115 ML/MIN/1.73M2 — SIGNIFICANT CHANGE UP
GLUCOSE SERPL-MCNC: 99 MG/DL — SIGNIFICANT CHANGE UP (ref 70–99)
HCT VFR BLD CALC: 39 % — SIGNIFICANT CHANGE UP (ref 34.5–45)
HGB BLD-MCNC: 12.4 G/DL — SIGNIFICANT CHANGE UP (ref 11.5–15.5)
LIDOCAIN IGE QN: 193 U/L — HIGH (ref 7–60)
MAGNESIUM SERPL-MCNC: 1.9 MG/DL — SIGNIFICANT CHANGE UP (ref 1.6–2.6)
MCHC RBC-ENTMCNC: 30.2 PG — SIGNIFICANT CHANGE UP (ref 27–34)
MCHC RBC-ENTMCNC: 31.8 GM/DL — LOW (ref 32–36)
MCV RBC AUTO: 94.9 FL — SIGNIFICANT CHANGE UP (ref 80–100)
NRBC # BLD: 0 /100 WBCS — SIGNIFICANT CHANGE UP (ref 0–0)
PHOSPHATE SERPL-MCNC: 3.1 MG/DL — SIGNIFICANT CHANGE UP (ref 2.5–4.5)
PLATELET # BLD AUTO: 341 K/UL — SIGNIFICANT CHANGE UP (ref 150–400)
POTASSIUM SERPL-MCNC: 3.6 MMOL/L — SIGNIFICANT CHANGE UP (ref 3.5–5.3)
POTASSIUM SERPL-SCNC: 3.6 MMOL/L — SIGNIFICANT CHANGE UP (ref 3.5–5.3)
PROT SERPL-MCNC: 6.6 G/DL — SIGNIFICANT CHANGE UP (ref 6–8.3)
RBC # BLD: 4.11 M/UL — SIGNIFICANT CHANGE UP (ref 3.8–5.2)
RBC # FLD: 13.3 % — SIGNIFICANT CHANGE UP (ref 10.3–14.5)
SODIUM SERPL-SCNC: 135 MMOL/L — SIGNIFICANT CHANGE UP (ref 135–145)
WBC # BLD: 9.43 K/UL — SIGNIFICANT CHANGE UP (ref 3.8–10.5)
WBC # FLD AUTO: 9.43 K/UL — SIGNIFICANT CHANGE UP (ref 3.8–10.5)

## 2023-04-27 PROCEDURE — 99231 SBSQ HOSP IP/OBS SF/LOW 25: CPT | Mod: 24

## 2023-04-27 PROCEDURE — 99231 SBSQ HOSP IP/OBS SF/LOW 25: CPT

## 2023-04-27 PROCEDURE — 99233 SBSQ HOSP IP/OBS HIGH 50: CPT

## 2023-04-27 PROCEDURE — 99232 SBSQ HOSP IP/OBS MODERATE 35: CPT | Mod: GC

## 2023-04-27 RX ORDER — POTASSIUM CHLORIDE 20 MEQ
40 PACKET (EA) ORAL ONCE
Refills: 0 | Status: COMPLETED | OUTPATIENT
Start: 2023-04-27 | End: 2023-04-27

## 2023-04-27 RX ORDER — MAGNESIUM SULFATE 500 MG/ML
1 VIAL (ML) INJECTION ONCE
Refills: 0 | Status: COMPLETED | OUTPATIENT
Start: 2023-04-27 | End: 2023-04-27

## 2023-04-27 RX ORDER — ACETAMINOPHEN 500 MG
1000 TABLET ORAL ONCE
Refills: 0 | Status: COMPLETED | OUTPATIENT
Start: 2023-04-27 | End: 2023-04-27

## 2023-04-27 RX ORDER — OXYCODONE HYDROCHLORIDE 5 MG/1
5 TABLET ORAL EVERY 6 HOURS
Refills: 0 | Status: DISCONTINUED | OUTPATIENT
Start: 2023-04-27 | End: 2023-04-29

## 2023-04-27 RX ORDER — HEPARIN SODIUM 5000 [USP'U]/ML
5000 INJECTION INTRAVENOUS; SUBCUTANEOUS ONCE
Refills: 0 | Status: COMPLETED | OUTPATIENT
Start: 2023-04-27 | End: 2023-04-27

## 2023-04-27 RX ADMIN — Medication 200 MILLIGRAM(S): at 18:00

## 2023-04-27 RX ADMIN — Medication 200 MILLIGRAM(S): at 06:07

## 2023-04-27 RX ADMIN — Medication 400 MILLIGRAM(S): at 06:30

## 2023-04-27 RX ADMIN — OXYCODONE HYDROCHLORIDE 5 MILLIGRAM(S): 5 TABLET ORAL at 23:30

## 2023-04-27 RX ADMIN — OXYCODONE HYDROCHLORIDE 5 MILLIGRAM(S): 5 TABLET ORAL at 18:34

## 2023-04-27 RX ADMIN — OXYCODONE HYDROCHLORIDE 5 MILLIGRAM(S): 5 TABLET ORAL at 10:30

## 2023-04-27 RX ADMIN — Medication 40 MILLIEQUIVALENT(S): at 09:19

## 2023-04-27 RX ADMIN — Medication 100 GRAM(S): at 09:17

## 2023-04-27 RX ADMIN — Medication 400 MILLIGRAM(S): at 21:23

## 2023-04-27 RX ADMIN — Medication 1000 MILLIGRAM(S): at 15:00

## 2023-04-27 RX ADMIN — SODIUM CHLORIDE 155 MILLILITER(S): 9 INJECTION, SOLUTION INTRAVENOUS at 06:07

## 2023-04-27 RX ADMIN — Medication 400 MILLIGRAM(S): at 14:34

## 2023-04-27 RX ADMIN — OXYCODONE HYDROCHLORIDE 5 MILLIGRAM(S): 5 TABLET ORAL at 09:33

## 2023-04-27 RX ADMIN — ONDANSETRON 4 MILLIGRAM(S): 8 TABLET, FILM COATED ORAL at 06:08

## 2023-04-27 RX ADMIN — ONDANSETRON 4 MILLIGRAM(S): 8 TABLET, FILM COATED ORAL at 18:00

## 2023-04-27 RX ADMIN — ONDANSETRON 4 MILLIGRAM(S): 8 TABLET, FILM COATED ORAL at 12:35

## 2023-04-27 RX ADMIN — SODIUM CHLORIDE 155 MILLILITER(S): 9 INJECTION, SOLUTION INTRAVENOUS at 01:28

## 2023-04-27 RX ADMIN — SODIUM CHLORIDE 155 MILLILITER(S): 9 INJECTION, SOLUTION INTRAVENOUS at 17:52

## 2023-04-27 RX ADMIN — Medication 1000 MILLIGRAM(S): at 06:45

## 2023-04-27 RX ADMIN — OXYCODONE HYDROCHLORIDE 5 MILLIGRAM(S): 5 TABLET ORAL at 17:34

## 2023-04-27 NOTE — DISCHARGE NOTE PROVIDER - NSDCCPCAREPLAN_GEN_ALL_CORE_FT
PRINCIPAL DISCHARGE DIAGNOSIS  Diagnosis: Choledocholithiasis  Assessment and Plan of Treatment:

## 2023-04-27 NOTE — DISCHARGE NOTE PROVIDER - NSDCFUADDAPPT_GEN_ALL_CORE_FT
Please follow up with Dr. Samano in 2 weeks. Call the office to schedule an appointment. [phone]    Please follow up with Dr. Almaguer in 2-4 weeks to evaluate when your stent should be removed. Call the office to schedule an appointment.  Please follow up with Dr. Samano on Monday. Call the office to schedule an [phone].    Please follow up with Dr. Almaguer in 2-4 weeks to evaluate when your stent should be removed. Call the office to schedule an appointment.     Please follow up with your primary care physician in 1-2 weeks for re-evaluation.

## 2023-04-27 NOTE — DISCHARGE NOTE PROVIDER - PROVIDER TOKENS
PROVIDER:[TOKEN:[[id number]],FOLLOWUP:[2 weeks]],PROVIDER:[TOKEN:[[id number]],FOLLOWUP:[2 weeks]]

## 2023-04-27 NOTE — PROGRESS NOTE ADULT - ATTENDING COMMENTS
Pt seen and d/w fellow.  S/p ERCP with stent placement in CBD and PD.  F/u in 8 weeks for removal of stents and balloon sweeps of duct.

## 2023-04-27 NOTE — DISCHARGE NOTE PROVIDER - NSDCFUADDINST_GEN_ALL_CORE_FT
Please follow up with your primary care physician in 1-2 weeks for re-evaluation.     Pain control:  Please continue to take 2 tabs of extra strength tylenol as needed for pain. DO NOT exceed 4000 mg per day.    Medications:  Please resume all regular home medications unless specifically advised not to take a particular medication.   Also, please take any new medications as prescribed.    Incision care:  *Please call your doctor or nurse practitioner if you have increased pain, swelling, redness, or drainage from the incision site.  *Please avoid swimming and bathing until your follow up appointment. You may shower, and wash surgical incisions with a mild soap and warm water. Gently pat the area dry.  *If you have staples, they will be removed at your follow-up appointment.  *If you have steri-strips, they will fall off on their own. Please remove any remaining strips 7-10 days after surgery.    Please get plenty of rest, continue to ambulate several times per day, and drink adequate amounts of fluids.   Avoid lifting weights greater than 5-10 lbs until you follow-up with your surgeon, who will instruct you further regarding activity restrictions.  Avoid driving or operating heavy machinery while taking pain medications.  Please follow-up with your surgeon and Primary Care Provider (PCP) as advised.     Please call your doctor if you experience any of the following:  -New chest pain, pressure, squeezing or tightness.  -New or worsening cough, shortness of breath or wheeze.  -If you are vomiting and cannot keep down medications.  -You see blood or dark black material when vomiting or moving your bowels.  -You experience burning when you urinate, blood in your urine, or experience discharge.  -Your pain is getting worse changes location or moves to your chest or back.  -You have shaking, chills, or fever greater than 100.5F or 38C.  -Any changes in your symptoms that concern you.       Pain control:  Please continue to take 2 tabs of extra strength tylenol as needed for pain. DO NOT exceed 4000 mg per day.    Medications:  Please resume all regular home medications unless specifically advised not to take a particular medication.   Also, please take any new medications as prescribed.    Incision care:  *Please call your doctor or nurse practitioner if you have increased pain, swelling, redness, or drainage from the incision site.  *Please avoid swimming and bathing until your follow up appointment. You may shower, and wash surgical incisions with a mild soap and warm water. Gently pat the area dry.  *If you have staples, they will be removed at your follow-up appointment.  *If you have steri-strips, they will fall off on their own. Please remove any remaining strips 7-10 days after surgery.    Please get plenty of rest, continue to ambulate several times per day, and drink adequate amounts of fluids.   Avoid lifting weights greater than 5-10 lbs until you follow-up with your surgeon, who will instruct you further regarding activity restrictions.  Avoid driving or operating heavy machinery while taking pain medications.  Please follow-up with your surgeon and Primary Care Provider (PCP) as advised.     Please call your doctor if you experience any of the following:  -New chest pain, pressure, squeezing or tightness.  -New or worsening cough, shortness of breath or wheeze.  -If you are vomiting and cannot keep down medications.  -You see blood or dark black material when vomiting or moving your bowels.  -You experience burning when you urinate, blood in your urine, or experience discharge.  -Your pain is getting worse changes location or moves to your chest or back.  -You have shaking, chills, or fever greater than 100.5F or 38C.  -Any changes in your symptoms that concern you.

## 2023-04-27 NOTE — DISCHARGE NOTE PROVIDER - NSDCMRMEDTOKEN_GEN_ALL_CORE_FT
oxyCODONE 5 mg oral tablet: 1 tab(s) orally every 6 hours as needed for  severe pain MDD: 4 tabs   oxyCODONE 5 mg oral tablet: 1 tab(s) orally every 8 hours as needed for -for severe pain MDD: 3

## 2023-04-27 NOTE — DISCHARGE NOTE PROVIDER - CARE PROVIDER_API CALL
Cody Samano)  Surgery  [address] [address]  Phone: [phone]  Fax: [phone]  Follow Up Time: 2 weeks    Armando Almaguer)  Med Specialties at [address] [place] [id number]  Phone: [phone]  Fax: [phone]  Follow Up Time: 2 weeks

## 2023-04-27 NOTE — DISCHARGE NOTE PROVIDER - HOSPITAL COURSE
43yo female with MO and POD 5 from RA- partial cholecystectomy presents to emergency with abdominal pain. Afebrile, non tachycardic, hypertensive, abdomen soft with minimal RUQ tenderness, no rebound. Labs demonstrate normal WBC, normal TB, slightly elevated AST/ALT, trace Nit with WBC on UA. RUQ u/s demonstrating partial cholecystectomy, with increased CBD diameter to 1cm with posterior shadowing. Admitted for management of choledocholithiasis. Now s/p ERCP sphincterotomy, balloon sweet and sludge removal, failed stone retrieval, stent placement x 2,(4/26). The post-operative course was uneventful. POD1 Lipase 193. The patient's diet was advanced as tolerated without nausea/vomiting, ambulating without difficulty, and voiding spontaneously. Pain is well controlled on oral pain medication. On day of discharge, pt deemed stable and ready to return home with plan to follow up as an outpatient.

## 2023-04-28 LAB
ALBUMIN SERPL ELPH-MCNC: 3.6 G/DL — SIGNIFICANT CHANGE UP (ref 3.3–5)
ALP SERPL-CCNC: 72 U/L — SIGNIFICANT CHANGE UP (ref 40–120)
ALT FLD-CCNC: 29 U/L — SIGNIFICANT CHANGE UP (ref 10–45)
ANION GAP SERPL CALC-SCNC: 9 MMOL/L — SIGNIFICANT CHANGE UP (ref 5–17)
APPEARANCE UR: CLEAR — SIGNIFICANT CHANGE UP
AST SERPL-CCNC: 18 U/L — SIGNIFICANT CHANGE UP (ref 10–40)
BILIRUB SERPL-MCNC: 0.5 MG/DL — SIGNIFICANT CHANGE UP (ref 0.2–1.2)
BILIRUB UR-MCNC: NEGATIVE — SIGNIFICANT CHANGE UP
BUN SERPL-MCNC: 3 MG/DL — LOW (ref 7–23)
CALCIUM SERPL-MCNC: 9.7 MG/DL — SIGNIFICANT CHANGE UP (ref 8.4–10.5)
CHLORIDE SERPL-SCNC: 100 MMOL/L — SIGNIFICANT CHANGE UP (ref 96–108)
CO2 SERPL-SCNC: 26 MMOL/L — SIGNIFICANT CHANGE UP (ref 22–31)
COLOR SPEC: YELLOW — SIGNIFICANT CHANGE UP
CREAT SERPL-MCNC: 0.67 MG/DL — SIGNIFICANT CHANGE UP (ref 0.5–1.3)
DIFF PNL FLD: NEGATIVE — SIGNIFICANT CHANGE UP
EGFR: 111 ML/MIN/1.73M2 — SIGNIFICANT CHANGE UP
GLUCOSE SERPL-MCNC: 119 MG/DL — HIGH (ref 70–99)
GLUCOSE UR QL: NEGATIVE — SIGNIFICANT CHANGE UP
HCT VFR BLD CALC: 41 % — SIGNIFICANT CHANGE UP (ref 34.5–45)
HCT VFR BLD CALC: 41.7 % — SIGNIFICANT CHANGE UP (ref 34.5–45)
HGB BLD-MCNC: 13.1 G/DL — SIGNIFICANT CHANGE UP (ref 11.5–15.5)
HGB BLD-MCNC: 13.1 G/DL — SIGNIFICANT CHANGE UP (ref 11.5–15.5)
KETONES UR-MCNC: ABNORMAL MG/DL
LEUKOCYTE ESTERASE UR-ACNC: NEGATIVE — SIGNIFICANT CHANGE UP
LIDOCAIN IGE QN: 132 U/L — HIGH (ref 7–60)
MAGNESIUM SERPL-MCNC: 1.9 MG/DL — SIGNIFICANT CHANGE UP (ref 1.6–2.6)
MCHC RBC-ENTMCNC: 30.1 PG — SIGNIFICANT CHANGE UP (ref 27–34)
MCHC RBC-ENTMCNC: 30.3 PG — SIGNIFICANT CHANGE UP (ref 27–34)
MCHC RBC-ENTMCNC: 31.4 GM/DL — LOW (ref 32–36)
MCHC RBC-ENTMCNC: 32 GM/DL — SIGNIFICANT CHANGE UP (ref 32–36)
MCV RBC AUTO: 94.9 FL — SIGNIFICANT CHANGE UP (ref 80–100)
MCV RBC AUTO: 95.9 FL — SIGNIFICANT CHANGE UP (ref 80–100)
NITRITE UR-MCNC: NEGATIVE — SIGNIFICANT CHANGE UP
NRBC # BLD: 0 /100 WBCS — SIGNIFICANT CHANGE UP (ref 0–0)
NRBC # BLD: 0 /100 WBCS — SIGNIFICANT CHANGE UP (ref 0–0)
PH UR: 7 — SIGNIFICANT CHANGE UP (ref 5–8)
PHOSPHATE SERPL-MCNC: 3 MG/DL — SIGNIFICANT CHANGE UP (ref 2.5–4.5)
PLATELET # BLD AUTO: 345 K/UL — SIGNIFICANT CHANGE UP (ref 150–400)
PLATELET # BLD AUTO: 372 K/UL — SIGNIFICANT CHANGE UP (ref 150–400)
POTASSIUM SERPL-MCNC: 4.4 MMOL/L — SIGNIFICANT CHANGE UP (ref 3.5–5.3)
POTASSIUM SERPL-SCNC: 4.4 MMOL/L — SIGNIFICANT CHANGE UP (ref 3.5–5.3)
PROT SERPL-MCNC: 6.8 G/DL — SIGNIFICANT CHANGE UP (ref 6–8.3)
PROT UR-MCNC: NEGATIVE MG/DL — SIGNIFICANT CHANGE UP
RBC # BLD: 4.32 M/UL — SIGNIFICANT CHANGE UP (ref 3.8–5.2)
RBC # BLD: 4.35 M/UL — SIGNIFICANT CHANGE UP (ref 3.8–5.2)
RBC # FLD: 13.3 % — SIGNIFICANT CHANGE UP (ref 10.3–14.5)
RBC # FLD: 13.3 % — SIGNIFICANT CHANGE UP (ref 10.3–14.5)
SODIUM SERPL-SCNC: 135 MMOL/L — SIGNIFICANT CHANGE UP (ref 135–145)
SP GR SPEC: 1.01 — SIGNIFICANT CHANGE UP (ref 1–1.03)
UROBILINOGEN FLD QL: 0.2 E.U./DL — SIGNIFICANT CHANGE UP
WBC # BLD: 15.77 K/UL — HIGH (ref 3.8–10.5)
WBC # BLD: 16.53 K/UL — HIGH (ref 3.8–10.5)
WBC # FLD AUTO: 15.77 K/UL — HIGH (ref 3.8–10.5)
WBC # FLD AUTO: 16.53 K/UL — HIGH (ref 3.8–10.5)

## 2023-04-28 PROCEDURE — 99232 SBSQ HOSP IP/OBS MODERATE 35: CPT

## 2023-04-28 PROCEDURE — 99233 SBSQ HOSP IP/OBS HIGH 50: CPT

## 2023-04-28 PROCEDURE — 99231 SBSQ HOSP IP/OBS SF/LOW 25: CPT | Mod: 24

## 2023-04-28 PROCEDURE — 71045 X-RAY EXAM CHEST 1 VIEW: CPT | Mod: 26

## 2023-04-28 PROCEDURE — 74177 CT ABD & PELVIS W/CONTRAST: CPT | Mod: 26

## 2023-04-28 RX ORDER — DIATRIZOATE MEGLUMINE 180 MG/ML
30 INJECTION, SOLUTION INTRAVESICAL ONCE
Refills: 0 | Status: COMPLETED | OUTPATIENT
Start: 2023-04-28 | End: 2023-04-28

## 2023-04-28 RX ORDER — HEPARIN SODIUM 5000 [USP'U]/ML
7500 INJECTION INTRAVENOUS; SUBCUTANEOUS EVERY 8 HOURS
Refills: 0 | Status: DISCONTINUED | OUTPATIENT
Start: 2023-04-28 | End: 2023-04-29

## 2023-04-28 RX ORDER — HEPARIN SODIUM 5000 [USP'U]/ML
5000 INJECTION INTRAVENOUS; SUBCUTANEOUS EVERY 8 HOURS
Refills: 0 | Status: DISCONTINUED | OUTPATIENT
Start: 2023-04-28 | End: 2023-04-28

## 2023-04-28 RX ORDER — MAGNESIUM SULFATE 500 MG/ML
1 VIAL (ML) INJECTION ONCE
Refills: 0 | Status: COMPLETED | OUTPATIENT
Start: 2023-04-28 | End: 2023-04-28

## 2023-04-28 RX ORDER — ACETAMINOPHEN 500 MG
1000 TABLET ORAL ONCE
Refills: 0 | Status: COMPLETED | OUTPATIENT
Start: 2023-04-28 | End: 2023-04-28

## 2023-04-28 RX ORDER — ACETAMINOPHEN 500 MG
1000 TABLET ORAL EVERY 6 HOURS
Refills: 0 | Status: DISCONTINUED | OUTPATIENT
Start: 2023-04-28 | End: 2023-04-29

## 2023-04-28 RX ADMIN — ONDANSETRON 4 MILLIGRAM(S): 8 TABLET, FILM COATED ORAL at 17:19

## 2023-04-28 RX ADMIN — HEPARIN SODIUM 5000 UNIT(S): 5000 INJECTION INTRAVENOUS; SUBCUTANEOUS at 00:04

## 2023-04-28 RX ADMIN — Medication 1000 MILLIGRAM(S): at 20:52

## 2023-04-28 RX ADMIN — OXYCODONE HYDROCHLORIDE 5 MILLIGRAM(S): 5 TABLET ORAL at 00:15

## 2023-04-28 RX ADMIN — OXYCODONE HYDROCHLORIDE 5 MILLIGRAM(S): 5 TABLET ORAL at 09:34

## 2023-04-28 RX ADMIN — Medication 1000 MILLIGRAM(S): at 15:00

## 2023-04-28 RX ADMIN — OXYCODONE HYDROCHLORIDE 5 MILLIGRAM(S): 5 TABLET ORAL at 17:31

## 2023-04-28 RX ADMIN — OXYCODONE HYDROCHLORIDE 5 MILLIGRAM(S): 5 TABLET ORAL at 18:30

## 2023-04-28 RX ADMIN — OXYCODONE HYDROCHLORIDE 5 MILLIGRAM(S): 5 TABLET ORAL at 10:30

## 2023-04-28 RX ADMIN — DIATRIZOATE MEGLUMINE 30 MILLILITER(S): 180 INJECTION, SOLUTION INTRAVESICAL at 14:19

## 2023-04-28 RX ADMIN — Medication 400 MILLIGRAM(S): at 04:49

## 2023-04-28 RX ADMIN — Medication 200 MILLIGRAM(S): at 06:19

## 2023-04-28 RX ADMIN — ONDANSETRON 4 MILLIGRAM(S): 8 TABLET, FILM COATED ORAL at 00:04

## 2023-04-28 RX ADMIN — Medication 1000 MILLIGRAM(S): at 21:30

## 2023-04-28 RX ADMIN — Medication 1000 MILLIGRAM(S): at 14:05

## 2023-04-28 RX ADMIN — ONDANSETRON 4 MILLIGRAM(S): 8 TABLET, FILM COATED ORAL at 12:12

## 2023-04-28 RX ADMIN — Medication 100 GRAM(S): at 07:42

## 2023-04-28 RX ADMIN — ONDANSETRON 4 MILLIGRAM(S): 8 TABLET, FILM COATED ORAL at 06:17

## 2023-04-28 RX ADMIN — ONDANSETRON 4 MILLIGRAM(S): 8 TABLET, FILM COATED ORAL at 23:36

## 2023-04-29 ENCOUNTER — TRANSCRIPTION ENCOUNTER (OUTPATIENT)
Age: 43
End: 2023-04-29

## 2023-04-29 VITALS
TEMPERATURE: 99 F | DIASTOLIC BLOOD PRESSURE: 72 MMHG | OXYGEN SATURATION: 96 % | RESPIRATION RATE: 18 BRPM | HEART RATE: 94 BPM | SYSTOLIC BLOOD PRESSURE: 103 MMHG

## 2023-04-29 LAB
ALBUMIN SERPL ELPH-MCNC: 3.7 G/DL — SIGNIFICANT CHANGE UP (ref 3.3–5)
ALP SERPL-CCNC: 74 U/L — SIGNIFICANT CHANGE UP (ref 40–120)
ALT FLD-CCNC: 20 U/L — SIGNIFICANT CHANGE UP (ref 10–45)
ANION GAP SERPL CALC-SCNC: 9 MMOL/L — SIGNIFICANT CHANGE UP (ref 5–17)
AST SERPL-CCNC: 14 U/L — SIGNIFICANT CHANGE UP (ref 10–40)
BILIRUB SERPL-MCNC: 0.4 MG/DL — SIGNIFICANT CHANGE UP (ref 0.2–1.2)
BUN SERPL-MCNC: 4 MG/DL — LOW (ref 7–23)
CALCIUM SERPL-MCNC: 9.9 MG/DL — SIGNIFICANT CHANGE UP (ref 8.4–10.5)
CHLORIDE SERPL-SCNC: 101 MMOL/L — SIGNIFICANT CHANGE UP (ref 96–108)
CO2 SERPL-SCNC: 27 MMOL/L — SIGNIFICANT CHANGE UP (ref 22–31)
CREAT SERPL-MCNC: 0.73 MG/DL — SIGNIFICANT CHANGE UP (ref 0.5–1.3)
EGFR: 105 ML/MIN/1.73M2 — SIGNIFICANT CHANGE UP
GLUCOSE SERPL-MCNC: 106 MG/DL — HIGH (ref 70–99)
HCT VFR BLD CALC: 38.6 % — SIGNIFICANT CHANGE UP (ref 34.5–45)
HGB BLD-MCNC: 12.1 G/DL — SIGNIFICANT CHANGE UP (ref 11.5–15.5)
MAGNESIUM SERPL-MCNC: 2.1 MG/DL — SIGNIFICANT CHANGE UP (ref 1.6–2.6)
MCHC RBC-ENTMCNC: 30.1 PG — SIGNIFICANT CHANGE UP (ref 27–34)
MCHC RBC-ENTMCNC: 31.3 GM/DL — LOW (ref 32–36)
MCV RBC AUTO: 96 FL — SIGNIFICANT CHANGE UP (ref 80–100)
NRBC # BLD: 0 /100 WBCS — SIGNIFICANT CHANGE UP (ref 0–0)
PHOSPHATE SERPL-MCNC: 3.3 MG/DL — SIGNIFICANT CHANGE UP (ref 2.5–4.5)
PLATELET # BLD AUTO: 361 K/UL — SIGNIFICANT CHANGE UP (ref 150–400)
POTASSIUM SERPL-MCNC: 4.6 MMOL/L — SIGNIFICANT CHANGE UP (ref 3.5–5.3)
POTASSIUM SERPL-SCNC: 4.6 MMOL/L — SIGNIFICANT CHANGE UP (ref 3.5–5.3)
PROT SERPL-MCNC: 7.2 G/DL — SIGNIFICANT CHANGE UP (ref 6–8.3)
RBC # BLD: 4.02 M/UL — SIGNIFICANT CHANGE UP (ref 3.8–5.2)
RBC # FLD: 13.4 % — SIGNIFICANT CHANGE UP (ref 10.3–14.5)
SODIUM SERPL-SCNC: 137 MMOL/L — SIGNIFICANT CHANGE UP (ref 135–145)
WBC # BLD: 15.32 K/UL — HIGH (ref 3.8–10.5)
WBC # FLD AUTO: 15.32 K/UL — HIGH (ref 3.8–10.5)

## 2023-04-29 PROCEDURE — 83690 ASSAY OF LIPASE: CPT

## 2023-04-29 PROCEDURE — 87635 SARS-COV-2 COVID-19 AMP PRB: CPT

## 2023-04-29 PROCEDURE — 99285 EMERGENCY DEPT VISIT HI MDM: CPT

## 2023-04-29 PROCEDURE — 71045 X-RAY EXAM CHEST 1 VIEW: CPT

## 2023-04-29 PROCEDURE — 86900 BLOOD TYPING SEROLOGIC ABO: CPT

## 2023-04-29 PROCEDURE — 80053 COMPREHEN METABOLIC PANEL: CPT

## 2023-04-29 PROCEDURE — 99238 HOSP IP/OBS DSCHRG MGMT 30/<: CPT | Mod: 24

## 2023-04-29 PROCEDURE — 80048 BASIC METABOLIC PNL TOTAL CA: CPT

## 2023-04-29 PROCEDURE — 74330 X-RAY BILE/PANC ENDOSCOPY: CPT

## 2023-04-29 PROCEDURE — 83735 ASSAY OF MAGNESIUM: CPT

## 2023-04-29 PROCEDURE — 85730 THROMBOPLASTIN TIME PARTIAL: CPT

## 2023-04-29 PROCEDURE — 81001 URINALYSIS AUTO W/SCOPE: CPT

## 2023-04-29 PROCEDURE — 85025 COMPLETE CBC W/AUTO DIFF WBC: CPT

## 2023-04-29 PROCEDURE — 81003 URINALYSIS AUTO W/O SCOPE: CPT

## 2023-04-29 PROCEDURE — C1769: CPT

## 2023-04-29 PROCEDURE — 36415 COLL VENOUS BLD VENIPUNCTURE: CPT

## 2023-04-29 PROCEDURE — C1889: CPT

## 2023-04-29 PROCEDURE — C2617: CPT

## 2023-04-29 PROCEDURE — 74177 CT ABD & PELVIS W/CONTRAST: CPT

## 2023-04-29 PROCEDURE — 86850 RBC ANTIBODY SCREEN: CPT

## 2023-04-29 PROCEDURE — 85610 PROTHROMBIN TIME: CPT

## 2023-04-29 PROCEDURE — 84100 ASSAY OF PHOSPHORUS: CPT

## 2023-04-29 PROCEDURE — 86901 BLOOD TYPING SEROLOGIC RH(D): CPT

## 2023-04-29 PROCEDURE — 80076 HEPATIC FUNCTION PANEL: CPT

## 2023-04-29 PROCEDURE — C1877: CPT

## 2023-04-29 PROCEDURE — 85027 COMPLETE CBC AUTOMATED: CPT

## 2023-04-29 PROCEDURE — 99233 SBSQ HOSP IP/OBS HIGH 50: CPT

## 2023-04-29 RX ORDER — OXYCODONE HYDROCHLORIDE 5 MG/1
1 TABLET ORAL
Qty: 10 | Refills: 0
Start: 2023-04-29

## 2023-04-29 RX ADMIN — Medication 1000 MILLIGRAM(S): at 12:28

## 2023-04-29 RX ADMIN — ONDANSETRON 4 MILLIGRAM(S): 8 TABLET, FILM COATED ORAL at 12:29

## 2023-04-29 RX ADMIN — OXYCODONE HYDROCHLORIDE 5 MILLIGRAM(S): 5 TABLET ORAL at 02:38

## 2023-04-29 RX ADMIN — OXYCODONE HYDROCHLORIDE 5 MILLIGRAM(S): 5 TABLET ORAL at 10:23

## 2023-04-29 RX ADMIN — OXYCODONE HYDROCHLORIDE 5 MILLIGRAM(S): 5 TABLET ORAL at 03:15

## 2023-04-29 RX ADMIN — ONDANSETRON 4 MILLIGRAM(S): 8 TABLET, FILM COATED ORAL at 05:57

## 2023-04-29 RX ADMIN — Medication 1000 MILLIGRAM(S): at 05:57

## 2023-04-29 NOTE — PROGRESS NOTE ADULT - SUBJECTIVE AND OBJECTIVE BOX
Patient is a 42y old  Female who presents with a chief complaint of choledocholithiasis  (27 Apr 2023 16:08)      INTERVAL HPI/OVERNIGHT EVENTS: offers no new complaints; current symptoms resolving    MEDICATIONS  (STANDING):  ondansetron Injectable 4 milliGRAM(s) IV Push every 6 hours    MEDICATIONS  (PRN):  oxyCODONE    IR 5 milliGRAM(s) Oral every 6 hours PRN Moderate Pain (4 - 6)      __________________________________________________  REVIEW OF SYSTEMS:    CONSTITUTIONAL: No fever,   EYES: no acute visual disturbances  NECK: No pain or stiffness  RESPIRATORY: No cough; No shortness of breath  CARDIOVASCULAR: No chest pain, no palpitations  GASTROINTESTINAL: No pain. No nausea or vomiting; No diarrhea   NEUROLOGICAL: No headache or numbness, no tremors  MUSCULOSKELETAL: No joint pain, no muscle pain  GENITOURINARY: no dysuria, no frequency, no hesitancy  PSYCHIATRY: no depression , no anxiety  ALL OTHER  ROS negative        Vital Signs Last 24 Hrs  T(C): 36.9 (28 Apr 2023 10:07), Max: 38 (28 Apr 2023 09:49)  T(F): 98.5 (28 Apr 2023 10:07), Max: 100.4 (28 Apr 2023 09:49)  HR: 72 (28 Apr 2023 09:49) (68 - 95)  BP: 151/81 (28 Apr 2023 09:49) (115/80 - 151/81)  BP(mean): 93 (28 Apr 2023 05:00) (93 - 93)  RR: 17 (28 Apr 2023 09:49) (17 - 18)  SpO2: 95% (28 Apr 2023 09:49) (95% - 97%)    Parameters below as of 28 Apr 2023 09:49  Patient On (Oxygen Delivery Method): room air        ________________________________________________  PHYSICAL EXAM:  GENERAL: NAD  HEENT: Normocephalic;  conjunctivae and sclerae clear; moist mucous membranes;   NECK : supple  CHEST/LUNG: Clear to auscultation bilaterally with good air entry   HEART: S1 S2  regular; no murmurs, gallops or rubs  ABDOMEN: Soft, TTP in epigastric pain, Nondistended; Bowel sounds present  EXTREMITIES: no cyanosis; no edema; no calf tenderness  SKIN: warm and dry; no rash  NERVOUS SYSTEM:  Awake and alert; Oriented  to place, person and time ; no new deficits  _________________________________________________  LABS:                        13.1   15.77 )-----------( 372      ( 28 Apr 2023 06:18 )             41.0     04-28    135  |  100  |  3<L>  ----------------------------<  119<H>  4.4   |  26  |  0.67    Ca    9.7      28 Apr 2023 06:18  Phos  3.0     04-28  Mg     1.9     04-28    TPro  6.8  /  Alb  3.6  /  TBili  0.5  /  DBili  x   /  AST  18  /  ALT  29  /  AlkPhos  72  04-28        CAPILLARY BLOOD GLUCOSE            RADIOLOGY & ADDITIONAL TESTS:      Plan of care was discussed with patient and /or primary care giver; all questions and concerns were addressed and care was aligned with patient's wishes.    
GASTROENTEROLOGY PROGRESS NOTE  Patient seen and examined at bedside. Mild epigastric tenderness. Otherwise feels well.     PERTINENT REVIEW OF SYSTEMS:  CONSTITUTIONAL: No weakness, fevers or chills  HEENT: No visual changes; No vertigo or throat pain   GASTROINTESTINAL: As above.  NEUROLOGICAL: No numbness or weakness  SKIN: No itching, burning, rashes, or lesions     Allergies    No Known Allergies    Intolerances      MEDICATIONS:  MEDICATIONS  (STANDING):  cefpodoxime 200 milliGRAM(s) Oral every 12 hours  lactated ringers. 1000 milliLiter(s) (155 mL/Hr) IV Continuous <Continuous>  ondansetron Injectable 4 milliGRAM(s) IV Push every 6 hours    MEDICATIONS  (PRN):  oxyCODONE    IR 5 milliGRAM(s) Oral every 6 hours PRN Moderate Pain (4 - 6)    Vital Signs Last 24 Hrs  T(C): 36.8 (27 Apr 2023 16:05), Max: 37.2 (27 Apr 2023 14:02)  T(F): 98.3 (27 Apr 2023 16:05), Max: 99 (27 Apr 2023 14:02)  HR: 68 (27 Apr 2023 16:05) (67 - 78)  BP: 119/78 (27 Apr 2023 16:05) (115/80 - 130/83)  BP(mean): --  RR: 18 (27 Apr 2023 16:05) (16 - 18)  SpO2: 96% (27 Apr 2023 16:05) (96% - 97%)    Parameters below as of 27 Apr 2023 16:05  Patient On (Oxygen Delivery Method): room air        04-26 @ 07:01  -  04-27 @ 07:00  --------------------------------------------------------  IN: 3495 mL / OUT: 1800 mL / NET: 1695 mL      PHYSICAL EXAM:    General: in no acute distress  HEENT: MMM, conjunctiva and sclera clear  Gastrointestinal: Soft mildly tender epigastric non-distended; No rebound or guarding  Skin: Warm and dry. No obvious rash    LABS:                        12.4   9.43  )-----------( 341      ( 27 Apr 2023 05:30 )             39.0     04-27    135  |  100  |  5<L>  ----------------------------<  99  3.6   |  26  |  0.59    Ca    9.4      27 Apr 2023 05:30  Phos  3.1     04-27  Mg     1.9     04-27    TPro  6.6  /  Alb  3.6  /  TBili  0.3  /  DBili  x   /  AST  35  /  ALT  41  /  AlkPhos  64  04-27    PT/INR - ( 26 Apr 2023 05:52 )   PT: 13.4 sec;   INR: 1.12          PTT - ( 26 Apr 2023 05:52 )  PTT:33.7 sec                  RADIOLOGY & ADDITIONAL STUDIES:  Reviewed
STATUS POST:  ERCP     POST OPERATIVE DAY #: 2    ON: tolerating low fat with mild epigastric pain. HSQ x1.      SUBJECTIVE: Pt seen and examined at bedside this am by surgery team. Pain improved. Tolerating diet without nausea or vomiting. Pain well controlled. Denies f/n/v/cp/sob.    Vital Signs Last 24 Hrs  T(C): 37.2 (28 Apr 2023 05:00), Max: 37.3 (27 Apr 2023 20:30)  T(F): 99 (28 Apr 2023 05:00), Max: 99.2 (27 Apr 2023 20:30)  HR: 95 (28 Apr 2023 05:00) (67 - 95)  BP: 122/79 (28 Apr 2023 05:00) (115/80 - 130/83)  BP(mean): 93 (28 Apr 2023 05:00) (93 - 93)  RR: 18 (28 Apr 2023 05:00) (16 - 18)  SpO2: 96% (28 Apr 2023 05:00) (95% - 97%)    Parameters below as of 28 Apr 2023 05:00  Patient On (Oxygen Delivery Method): room air        PHYSICAL EXAM:   Gen: Awake, alert, NAD, resting comfortably   CV: RRR  Pulm: no respiratory distress on RA  Abd: soft, ND, NTTP, no rebound or guarding   Ext: WWP, no edema, SCDs in place     I&O's Detail    27 Apr 2023 07:01  -  28 Apr 2023 07:00  --------------------------------------------------------  IN:    Lactated Ringers: 3255 mL    Oral Fluid: 890 mL  Total IN: 4145 mL    OUT:    Voided (mL): 2600 mL  Total OUT: 2600 mL    Total NET: 1545 mL          LABS:                        13.1   15.77 )-----------( 372      ( 28 Apr 2023 06:18 )             41.0     04-28    135  |  100  |  3<L>  ----------------------------<  119<H>  4.4   |  26  |  0.67    Ca    9.7      28 Apr 2023 06:18  Phos  3.0     04-28  Mg     1.9     04-28    TPro  6.8  /  Alb  3.6  /  TBili  0.5  /  DBili  x   /  AST  18  /  ALT  29  /  AlkPhos  72  04-28    LIVER FUNCTIONS - ( 28 Apr 2023 06:18 )  Alb: 3.6 g/dL / Pro: 6.8 g/dL / ALK PHOS: 72 U/L / ALT: 29 U/L / AST: 18 U/L / GGT: x             CAPILLARY BLOOD GLUCOSE          RADIOLOGY & ADDITIONAL STUDIES:
STATUS POST:  ERCP    POST PROCEDURE DAY #: 3    SUBJECTIVE: Pt seen and examined at bedside this am by surgery team. Patient is lying comfortably in bed with no complaints. Tolerating diet, pain well controlled with current regimen and improved from prior. Patient denies fever, nausea, vomiting, chest pain, and shortness of breath. Patient is passing gas and having bowel movements.     MEDICATIONS  (STANDING):  heparin   Injectable 7500 Unit(s) SubCutaneous every 8 hours  ondansetron Injectable 4 milliGRAM(s) IV Push every 6 hours    MEDICATIONS  (PRN):  acetaminophen     Tablet .. 1000 milliGRAM(s) Oral every 6 hours PRN Mild Pain (1 - 3),  oxyCODONE    IR 5 milliGRAM(s) Oral every 6 hours PRN Moderate Pain (4 - 6)      Vital Signs Last 24 Hrs  T(C): 36.8 (2023 05:27), Max: 37.1 (2023 16:05)  T(F): 98.2 (2023 05:27), Max: 98.8 (2023 16:05)  HR: 85 (2023 05:27) (85 - 94)  BP: 121/83 (2023 05:27) (117/77 - 135/85)  BP(mean): --  RR: 18 (2023 05:27) (18 - 18)  SpO2: 94% (2023 05:27) (94% - 99%)    Parameters below as of 2023 05:27  Patient On (Oxygen Delivery Method): room air        Physical Exam  General: Patient is doing well and lying in bed comfortably  Constitutional: alert and oriented   Pulm: Nonlabored breathing, no respiratory distress  CV: Regular rate and rhythm, normal sinus rhythm  Abd:  soft, nontender, nondistended. No rebound, no guarding.   Extremities: warm, well perfused, no edema    I&O's Detail    2023 07:01  -  2023 07:00  --------------------------------------------------------  IN:  Total IN: 0 mL    OUT:    Voided (mL): 2050 mL  Total OUT: 2050 mL    Total NET: - mL      2023 07:01  -  2023 11:27  --------------------------------------------------------  IN:    Oral Fluid: 154 mL  Total IN: 154 mL    OUT:  Total OUT: 0 mL    Total NET: 154 mL        LABS:                        12.1   15.32 )-----------( 361      ( 2023 05:30 )             38.6         137  |  101  |  4<L>  ----------------------------<  106<H>  4.6   |  27  |  0.73    Ca    9.9      2023 05:30  Phos  3.3       Mg     2.1         TPro  7.2  /  Alb  3.7  /  TBili  0.4  /  DBili  x   /  AST  14  /  ALT  20  /  AlkPhos  74        Urinalysis Basic - ( 2023 14:26 )    Color: Yellow / Appearance: Clear / S.010 / pH: x  Gluc: x / Ketone: Trace mg/dL  / Bili: Negative / Urobili: 0.2 E.U./dL   Blood: x / Protein: NEGATIVE mg/dL / Nitrite: NEGATIVE   Leuk Esterase: NEGATIVE / RBC: x / WBC x   Sq Epi: x / Non Sq Epi: x / Bacteria: x    
  Patient is a 42y old  Female who presents with a chief complaint of     INTERVAL HPI/OVERNIGHT EVENTS: offers no new complaints; current symptoms resolving    MEDICATIONS  (STANDING):  cefpodoxime 200 milliGRAM(s) Oral every 12 hours  lactated ringers. 1000 milliLiter(s) (155 mL/Hr) IV Continuous <Continuous>  ondansetron Injectable 4 milliGRAM(s) IV Push every 6 hours    MEDICATIONS  (PRN):  oxyCODONE    IR 5 milliGRAM(s) Oral every 6 hours PRN Moderate Pain (4 - 6)      __________________________________________________  REVIEW OF SYSTEMS:    CONSTITUTIONAL: No fever,   EYES: no acute visual disturbances  NECK: No pain or stiffness  RESPIRATORY: No cough; No shortness of breath  CARDIOVASCULAR: No chest pain, no palpitations  GASTROINTESTINAL: No pain. No nausea or vomiting; No diarrhea   NEUROLOGICAL: No headache or numbness, no tremors  MUSCULOSKELETAL: No joint pain, no muscle pain  GENITOURINARY: no dysuria, no frequency, no hesitancy  PSYCHIATRY: no depression , no anxiety  ALL OTHER  ROS negative        Vital Signs Last 24 Hrs  T(C): 36.8 (2023 10:40), Max: 36.9 (2023 21:16)  T(F): 98.2 (2023 10:40), Max: 98.5 (2023 05:18)  HR: 67 (2023 10:40) (65 - 78)  BP: 130/83 (2023 10:40) (120/70 - 130/83)  BP(mean): --  RR: 16 (2023 10:40) (16 - 17)  SpO2: 97% (2023 10:40) (96% - 98%)    Parameters below as of 2023 10:40  Patient On (Oxygen Delivery Method): room air        ________________________________________________  PHYSICAL EXAM:  GENERAL: NAD  HEENT: Normocephalic;  conjunctivae and sclerae clear; moist mucous membranes;   NECK : supple  CHEST/LUNG: Clear to auscultation bilaterally with good air entry   HEART: S1 S2  regular; no murmurs, gallops or rubs  ABDOMEN: Soft, TTP in epigastric pain, Nondistended; Bowel sounds present  EXTREMITIES: no cyanosis; no edema; no calf tenderness  SKIN: warm and dry; no rash  NERVOUS SYSTEM:  Awake and alert; Oriented  to place, person and time ; no new deficits    _________________________________________________  LABS:                        12.4   9.43  )-----------( 341      ( 2023 05:30 )             39.0         135  |  100  |  5<L>  ----------------------------<  99  3.6   |  26  |  0.59    Ca    9.4      2023 05:30  Phos  3.1       Mg     1.9         TPro  6.6  /  Alb  3.6  /  TBili  0.3  /  DBili  x   /  AST  35  /  ALT  41  /  AlkPhos  64  -27    PT/INR - ( 2023 05:52 )   PT: 13.4 sec;   INR: 1.12          PTT - ( 2023 05:52 )  PTT:33.7 sec  Urinalysis Basic - ( 2023 15:55 )    Color: Yellow / Appearance: Clear / S.025 / pH: x  Gluc: x / Ketone: Trace mg/dL  / Bili: Negative / Urobili: 0.2 E.U./dL   Blood: x / Protein: NEGATIVE mg/dL / Nitrite: NEGATIVE   Leuk Esterase: Trace / RBC: > 10 /HPF / WBC > 10 /HPF   Sq Epi: x / Non Sq Epi: x / Bacteria: Present /HPF      CAPILLARY BLOOD GLUCOSE            RADIOLOGY & ADDITIONAL TESTS:      Plan of care was discussed with patient and /or primary care giver; all questions and concerns were addressed and care was aligned with patient's wishes.    
  Patient is a 42y old  Female who presents with a chief complaint of choledocholithiasis  (2023 16:08)      INTERVAL HPI/OVERNIGHT EVENTS: offers no new complaints     MEDICATIONS  (STANDING):  heparin   Injectable 7500 Unit(s) SubCutaneous every 8 hours  ondansetron Injectable 4 milliGRAM(s) IV Push every 6 hours    MEDICATIONS  (PRN):  acetaminophen     Tablet .. 1000 milliGRAM(s) Oral every 6 hours PRN Mild Pain (1 - 3),  oxyCODONE    IR 5 milliGRAM(s) Oral every 6 hours PRN Moderate Pain (4 - 6)      __________________________________________________  REVIEW OF SYSTEMS:    CONSTITUTIONAL: No fever,   EYES: no acute visual disturbances  NECK: No pain or stiffness  RESPIRATORY: No cough; No shortness of breath  CARDIOVASCULAR: No chest pain, no palpitations  GASTROINTESTINAL: + pain. No nausea or vomiting; No diarrhea   NEUROLOGICAL: No headache or numbness, no tremors  MUSCULOSKELETAL: +back pain, no muscle pain  GENITOURINARY: no dysuria, no frequency, no hesitancy  PSYCHIATRY: no depression , no anxiety  ALL OTHER  ROS negative        Vital Signs Last 24 Hrs  T(C): 37.3 (2023 12:57), Max: 37.3 (2023 12:57)  T(F): 99.2 (2023 12:57), Max: 99.2 (2023 12:57)  HR: 94 (2023 12:57) (85 - 94)  BP: 103/72 (2023 12:57) (103/72 - 121/83)  BP(mean): --  RR: 18 (2023 12:57) (18 - 18)  SpO2: 96% (2023 12:57) (94% - 96%)    Parameters below as of 2023 12:57  Patient On (Oxygen Delivery Method): room air        ________________________________________________  PHYSICAL EXAM:  GENERAL: NAD  HEENT: Normocephalic;  conjunctivae and sclerae clear; moist mucous membranes;   NECK : supple  CHEST/LUNG: Clear to auscultation bilaterally with good air entry   HEART: S1 S2  regular; no murmurs, gallops or rubs  ABDOMEN: Soft, TTP in epigastric region, Back, Nondistended; Bowel sounds present  EXTREMITIES: no cyanosis; no edema; no calf tenderness  SKIN: warm and dry; no rash  NERVOUS SYSTEM:  Awake and alert; Oriented  to place, person and time ; no new deficits    _________________________________________________  LABS:                        12.1   15.32 )-----------( 361      ( 2023 05:30 )             38.6         137  |  101  |  4<L>  ----------------------------<  106<H>  4.6   |  27  |  0.73    Ca    9.9      2023 05:30  Phos  3.3       Mg     2.1         TPro  7.2  /  Alb  3.7  /  TBili  0.4  /  DBili  x   /  AST  14  /  ALT  20  /  AlkPhos  74        Urinalysis Basic - ( 2023 14:26 )    Color: Yellow / Appearance: Clear / S.010 / pH: x  Gluc: x / Ketone: Trace mg/dL  / Bili: Negative / Urobili: 0.2 E.U./dL   Blood: x / Protein: NEGATIVE mg/dL / Nitrite: NEGATIVE   Leuk Esterase: NEGATIVE / RBC: x / WBC x   Sq Epi: x / Non Sq Epi: x / Bacteria: x      CAPILLARY BLOOD GLUCOSE            RADIOLOGY & ADDITIONAL TESTS:      Plan of care was discussed with patient and /or primary care giver; all questions and concerns were addressed and care was aligned with patient's wishes.    
STATUS POST:  ERCP, spincterotomy, balloon sweet and sludge removal, failed stone retrieval, stent placement x      SUBJECTIVE: Patient seen and examined bedside by chief resident. complains of epigastric pain. similar to pain on admission.     cefpodoxime 200 milliGRAM(s) Oral every 12 hours      Vital Signs Last 24 Hrs  T(C): 36.9 (2023 05:18), Max: 37.3 (2023 09:09)  T(F): 98.5 (2023 05:18), Max: 99.2 (2023 09:09)  HR: 69 (2023 05:18) (58 - 78)  BP: 125/79 (2023 05:18) (101/66 - 126/72)  BP(mean): --  RR: 17 (2023 05:18) (17 - 17)  SpO2: 96% (2023 05:18) (96% - 98%)    Parameters below as of 2023 05:18  Patient On (Oxygen Delivery Method): room air      I&O's Detail    2023 07:01  -  2023 07:00  --------------------------------------------------------  IN:    Lactated Ringers: 3255 mL    Oral Fluid: 240 mL  Total IN: 3495 mL    OUT:    Voided (mL): 1800 mL  Total OUT: 1800 mL    Total NET: 1695 mL          General: NAD, resting comfortably in bed  C/V: NSR  Pulm: Nonlabored breathing, no respiratory distress  Abd: soft, ND, epigastric and RUQ TTP  Extrem: WWP, no edema, SCDs in place        LABS:                        12.2   8.12  )-----------( 349      ( 2023 05:52 )             38.9         137  |  104  |  7   ----------------------------<  98  4.9   |  25  |  0.79    Ca    9.6      2023 05:52  Phos  4.2       Mg     2.0         TPro  6.5  /  Alb  3.4  /  TBili  0.4  /  DBili  <0.2  /  AST  67<H>  /  ALT  55<H>  /  AlkPhos  65      PT/INR - ( 2023 05:52 )   PT: 13.4 sec;   INR: 1.12          PTT - ( 2023 05:52 )  PTT:33.7 sec  Urinalysis Basic - ( 2023 15:55 )    Color: Yellow / Appearance: Clear / S.025 / pH: x  Gluc: x / Ketone: Trace mg/dL  / Bili: Negative / Urobili: 0.2 E.U./dL   Blood: x / Protein: NEGATIVE mg/dL / Nitrite: NEGATIVE   Leuk Esterase: Trace / RBC: > 10 /HPF / WBC > 10 /HPF   Sq Epi: x / Non Sq Epi: x / Bacteria: Present /HPF        RADIOLOGY & ADDITIONAL STUDIES:  
GASTROENTEROLOGY PROGRESS NOTE  Patient seen and examined at bedside. Mild epigastric tenderness.     PERTINENT REVIEW OF SYSTEMS:  CONSTITUTIONAL: No weakness, fevers or chills  HEENT: No visual changes; No vertigo or throat pain   GASTROINTESTINAL: As above.  NEUROLOGICAL: No numbness or weakness  SKIN: No itching, burning, rashes, or lesions     Allergies    No Known Allergies    Intolerances      MEDICATIONS:  MEDICATIONS  (STANDING):  heparin   Injectable 5000 Unit(s) SubCutaneous every 8 hours  ondansetron Injectable 4 milliGRAM(s) IV Push every 6 hours    MEDICATIONS  (PRN):  acetaminophen     Tablet .. 1000 milliGRAM(s) Oral every 6 hours PRN Mild Pain (1 - 3),  oxyCODONE    IR 5 milliGRAM(s) Oral every 6 hours PRN Moderate Pain (4 - 6)    Vital Signs Last 24 Hrs  T(C): 36.8 (2023 12:59), Max: 38 (2023 09:49)  T(F): 98.2 (2023 12:59), Max: 100.4 (2023 09:49)  HR: 92 (2023 12:59) (68 - 95)  BP: 135/85 (2023 12:59) (117/78 - 151/81)  BP(mean): 93 (2023 05:00) (93 - 93)  RR: 18 (2023 12:59) (17 - 18)  SpO2: 99% (2023 12:59) (95% - 99%)    Parameters below as of 2023 12:59  Patient On (Oxygen Delivery Method): room air         @ 07:01  -   @ 07:00  --------------------------------------------------------  IN: 4145 mL / OUT: 2600 mL / NET: 1545 mL      PHYSICAL EXAM:    General: in no acute distress  HEENT: MMM, conjunctiva and sclera clear  Gastrointestinal: Soft minimal epigastric tenderness non-distended; No rebound or guarding  Skin: Warm and dry. No obvious rash    LABS:                        13.1   16.53 )-----------( 345      ( 2023 12:00 )             41.7         135  |  100  |  3<L>  ----------------------------<  119<H>  4.4   |  26  |  0.67    Ca    9.7      2023 06:18  Phos  3.0       Mg     1.9         TPro  6.8  /  Alb  3.6  /  TBili  0.5  /  DBili  x   /  AST  18  /  ALT  29  /  AlkPhos  72            Urinalysis Basic - ( 2023 14:26 )    Color: Yellow / Appearance: Clear / S.010 / pH: x  Gluc: x / Ketone: Trace mg/dL  / Bili: Negative / Urobili: 0.2 E.U./dL   Blood: x / Protein: NEGATIVE mg/dL / Nitrite: NEGATIVE   Leuk Esterase: NEGATIVE / RBC: x / WBC x   Sq Epi: x / Non Sq Epi: x / Bacteria: x                Urinalysis with Rflx Culture (collected 2023 14:26)      RADIOLOGY & ADDITIONAL STUDIES:  Reviewed
SUBJECTIVE: Patient seen and examined bedside by chief resident. pain manageable with pain medication. understands plan for possible ERCP today    cefpodoxime 200 milliGRAM(s) Oral every 12 hours      Vital Signs Last 24 Hrs  T(C): 36.6 (2023 04:08), Max: 37.1 (2023 18:08)  T(F): 97.8 (2023 04:08), Max: 98.8 (2023 19:16)  HR: 67 (2023 04:08) (61 - 77)  BP: 133/82 (2023 04:08) (109/77 - 147/89)  BP(mean): --  RR: 17 (2023 04:08) (16 - 18)  SpO2: 97% (2023 04:08) (97% - 100%)    Parameters below as of 2023 04:08  Patient On (Oxygen Delivery Method): room air      I&O's Detail    2023 07:01  -  2023 07:00  --------------------------------------------------------  IN:    Lactated Ringers: 1395 mL  Total IN: 1395 mL    OUT:    Voided (mL): 550 mL  Total OUT: 550 mL    Total NET: 845 mL          General: NAD, resting comfortably in bed  C/V: NSR  Pulm: Nonlabored breathing, no respiratory distress  Abd: soft, ND, some RUQ TTP  Extrem: WWP, no edema, SCDs in place        LABS:                        12.2   8.12  )-----------( 349      ( 2023 05:52 )             38.9     04-26    137  |  104  |  7   ----------------------------<  98  4.9   |  25  |  0.79    Ca    9.6      2023 05:52  Phos  4.2     04-  Mg     2.0         TPro  6.5  /  Alb  3.4  /  TBili  0.4  /  DBili  <0.2  /  AST  67<H>  /  ALT  55<H>  /  AlkPhos  65  04-26    PT/INR - ( 2023 05:52 )   PT: 13.4 sec;   INR: 1.12          PTT - ( 2023 05:52 )  PTT:33.7 sec  Urinalysis Basic - ( 2023 15:55 )    Color: Yellow / Appearance: Clear / S.025 / pH: x  Gluc: x / Ketone: Trace mg/dL  / Bili: Negative / Urobili: 0.2 E.U./dL   Blood: x / Protein: NEGATIVE mg/dL / Nitrite: NEGATIVE   Leuk Esterase: Trace / RBC: > 10 /HPF / WBC > 10 /HPF   Sq Epi: x / Non Sq Epi: x / Bacteria: Present /HPF        RADIOLOGY & ADDITIONAL STUDIES:

## 2023-04-29 NOTE — DISCHARGE NOTE NURSING/CASE MANAGEMENT/SOCIAL WORK - PATIENT PORTAL LINK FT
You can access the FollowMyHealth Patient Portal offered by [facility] by registering at the following website: [email or web address]. By joining [employer]’s FollowMyHealth portal, you will also be able to view your health information using other applications (apps) compatible with our system.

## 2023-04-29 NOTE — DISCHARGE NOTE NURSING/CASE MANAGEMENT/SOCIAL WORK - [email or web address]_GEN_ALL_CORE
For information on Fall & Injury Prevention, visit: [email or web address] OR  [email or web address] OR  https://www.cdc.gov/steadi/patient.html

## 2023-04-29 NOTE — PROGRESS NOTE ADULT - ASSESSMENT
41yo Female pt with PMH no PMH and PSH of robotic assisted partial cholecystectomy. Patient is POD5 from RA- lap marbin performed for acute cholecystitis and has been recovering at home. She was evaluated in the office today with complaint of abdominal pain starting at 10pm last night. Pain was similar to preop pain, located in RUQ, intermittent, sharp a/w nausea with emesis. She has been tolerating PO intake, denies fevers, chills, chest pain, dyspnea, diarrhea. Has taken 1 oxycodone this morning without relief. GI consulted for choledocholithiasis.     #Choledocholithiasis  - personally reviewed imaging with proximal CBD stones  - s/p ERCP with proximal duct stones s/p 5Fr x 6cm single pigtail pancreatic stent and a 7Fr x 7cm double pigtail stent in the CBD  - will arrange for outpatient ERCP in approximately 8 weeks for stent removal and sweeping of the duct   - would obtain CT given increased WBC count, unclear source especially i/s/o improved liver enzymes     My Johnson MD  PGY-6, Gastroenterology Fellow  pager: [phone]
41yo female with MO and POD 5 from RA- partial cholecystectomy presents to emergency with abdominal pain. Afebrile, non tachycardic, hypertensive, abdomen soft with minimal RUQ tenderness, no rebound. Labs demonstrate normal WBC, normal TB, slightly elevated AST/ALT, trace Nit with WBC on UA. RUQ u/s demonstrating partial cholecystectomy, with increased CBD diameter to 1cm with posterior shadowing. Differential includes choledocholithaisis, no evidence of cholangitis.     - CLD, NPO mn/IVF  - Abx: Cefpodoxime for UTI  - Pain and Nausea control  - GI consult   - SCD/SQH x1 dose hold for potential GI procedure  - IS/OOB  - AM labs  
42 year old female with hx of gerd and hx of fibroids, was recently admitted at [facility] and had a partial cholecystectomy 4/20 presents to ED with abdominal pain. RUQ u/s demonstrating partial cholecystectomy, with increased CBD diameter to 1cm with posterior shadowing    #suspected choledocholithiasis   #post operative state   #transaminitis   #sepsis   Temp 100.4, wbc 15k- would recommend to send bcx; repeat labs showed wbc stable at 15k CT abdomen showed pancreatitis -   US as above   hx of cholecystitis and is s/p RA partial cholecystectomy (4/20)  OOTB to chair   Encourage ambulation ; Encourage incentive spirometry   s/p ERCP + stent- repeat ercp in 8 weeks per GI    LFT's trending down from 100/68--18/29  rest of the management per primary team    #UTI   UA positive   s/p Cefpodoxime for UTI   no s/s of urunary discomofrt       #Gerd   can do Pepcid if symptoms reoccur     #dvp ppx scd,   
42 year old female with hx of gerd and hx of fibroids, was recently admitted at [facility] and had a partial cholecystectomy 4/20 presents to ED with abdominal pain. RUQ u/s demonstrating partial cholecystectomy, with increased CBD diameter to 1cm with posterior shadowing    #suspected choledocholithiasis   #post operative state   #transaminitis   #sepsis   Temp 100.4, wbc 15k- would recommend to send bcx; pending repeat labs   US as above   hx of cholecystitis and is s/p RA partial cholecystectomy (4/20)  OOTB to chair   Encourage ambulation ; Encourage incentive spirometry   s/p ERCP + stent- repeat ercp in 8 weeks per GI    LFT's trending down from 100/68--18/29  rest of the management per primary team    #UTI   UA positive   s/p Cefpodoxime for UTI       #Gerd   can do Pepcid if symptoms reoccur     #dvp ppx scd,   
43yo Female pt with PMH no PMH and PSH of robotic assisted partial cholecystectomy. Patient is POD5 from RA- lap marbin performed for acute cholecystitis and has been recovering at home. She was evaluated in the office today with complaint of abdominal pain starting at 10pm last night. Pain was similar to preop pain, located in RUQ, intermittent, sharp a/w nausea with emesis. She has been tolerating PO intake, denies fevers, chills, chest pain, dyspnea, diarrhea. Has taken 1 oxycodone this morning without relief. GI consulted for choledocholithiasis.     #Choledocholithiasis  - personally reviewed imaging with proximal CBD stones  - s/p ERCP with proximal duct stones s/p 5Fr x 6cm single pigtail pancreatic stent and a 7Fr x 7cm double pigtail stent in the CBD  - will arrange for outpatient ERCP in approximately 8 weeks for stent removal and sweeping of the duct     My Johnson MD  PGY-6, Gastroenterology Fellow  pager: [phone]
41yo female with MO and POD 5 from RA- partial cholecystectomy presents to emergency with abdominal pain. Afebrile, non tachycardic, hypertensive, abdomen soft with minimal RUQ tenderness, no rebound. Labs demonstrate normal WBC, normal TB, slightly elevated AST/ALT, trace Nit with WBC on UA. RUQ u/s demonstrating partial cholecystectomy, with increased CBD diameter to 1cm with posterior shadowing. Differential includes choledocholithaisis, no evidence of cholangitis. Now s/p ERCP (4/26).    - CLD  - Abx: Cefpodoxime for UTI  - Pain and Nausea control  - IS/OOB  - AM labs  
41yo female with MO and POD 5 from RA- partial cholecystectomy presents to emergency with abdominal pain. Afebrile, non tachycardic, hypertensive, abdomen soft with minimal RUQ tenderness, no rebound. Labs demonstrate normal WBC, normal TB, slightly elevated AST/ALT, trace Nit with WBC on UA. RUQ u/s demonstrating partial cholecystectomy, with increased CBD diameter to 1cm with posterior shadowing. Differential includes choledocholithaisis, no evidence of cholangitis. Now s/p ERCP (4/26).    - Low fat diet   - s/p Cefpodoxime for UTI  - Pain and Nausea control  - IS/OOB  - AM labs  
42 year old female with hx of gerd and hx of fibroids, was recently admitted at [facility] and had a partial cholecystectomy 4/20 presents to ED with abdominal pain. RUQ u/s demonstrating partial cholecystectomy, with increased CBD diameter to 1cm with posterior shadowing    #suspected choledocholithiasis   #post operative state   #transaminitis   US as above   hx of cholecystitis and is s/p RA partial cholecystectomy (4/20)  OOTB to chair   Encourage ambulation ; Encourage incentive spirometry   s/p ERCP + stent- repeat ercp in 8 weeks per GI    LFT's trending down from 100/68--67/55  rest of the management per primary team    #UTI   UA positive   Cefpodoxime for UTI       #Gerd   can do Pepcid if symptoms reoccur     #dvp ppx scd,   
43yo female with MO and POD 5 from RA- partial cholecystectomy presents to emergency with abdominal pain. Afebrile, non tachycardic, hypertensive, abdomen soft with minimal RUQ tenderness, no rebound. Labs demonstrate normal WBC, normal TB, slightly elevated AST/ALT, trace Nit with WBC on UA. RUQ u/s demonstrating partial cholecystectomy, with increased CBD diameter to 1cm with posterior shadowing. Differential includes choledocholithaisis, no evidence of cholangitis. Now s/p ERCP (4/26).    - Low fat diet   - s/p cefpodoxime for UTI  - Pain and Nausea control  - IS/OOB/SQH  - AM labs

## 2023-04-29 NOTE — PROGRESS NOTE ADULT - PROVIDER SPECIALTY LIST ADULT
Internal Medicine
Surgery
Gastroenterology
Surgery
Gastroenterology
Internal Medicine
Internal Medicine

## 2023-04-29 NOTE — DISCHARGE NOTE NURSING/CASE MANAGEMENT/SOCIAL WORK - NSDCFUADDAPPT_GEN_ALL_CORE_FT
Please follow up with Dr. Samano on Monday. Call the office to schedule an [phone].    Please follow up with Dr. Almaguer in 2-4 weeks to evaluate when your stent should be removed. Call the office to schedule an appointment.     Please follow up with your primary care physician in 1-2 weeks for re-evaluation.

## 2023-05-01 ENCOUNTER — NON-APPOINTMENT (OUTPATIENT)
Age: 43
End: 2023-05-01

## 2023-05-01 LAB — SURGICAL PATHOLOGY STUDY: SIGNIFICANT CHANGE UP

## 2023-05-03 ENCOUNTER — APPOINTMENT (OUTPATIENT)
Dept: BARIATRICS | Facility: CLINIC | Age: 43
End: 2023-05-03
Payer: COMMERCIAL

## 2023-05-03 VITALS
DIASTOLIC BLOOD PRESSURE: 98 MMHG | HEIGHT: 61 IN | SYSTOLIC BLOOD PRESSURE: 133 MMHG | WEIGHT: 248.5 LBS | OXYGEN SATURATION: 96 % | BODY MASS INDEX: 46.92 KG/M2 | HEART RATE: 98 BPM | TEMPERATURE: 98.1 F

## 2023-05-03 DIAGNOSIS — K80.50 CALCULUS OF BILE DUCT W/OUT CHOLANGITIS OR CHOLECYSTITIS W/OUT OBSTRUCTION: ICD-10-CM

## 2023-05-03 DIAGNOSIS — K80.50 CALCULUS OF BILE DUCT WITHOUT CHOLANGITIS OR CHOLECYSTITIS WITHOUT OBSTRUCTION: ICD-10-CM

## 2023-05-03 DIAGNOSIS — K85.90 ACUTE PANCREATITIS WITHOUT NECROSIS OR INFECTION, UNSPECIFIED: ICD-10-CM

## 2023-05-03 DIAGNOSIS — Z87.19 PERSONAL HISTORY OF OTHER DISEASES OF THE DIGESTIVE SYSTEM: ICD-10-CM

## 2023-05-03 DIAGNOSIS — A41.9 SEPSIS, UNSPECIFIED ORGANISM: ICD-10-CM

## 2023-05-03 DIAGNOSIS — N39.0 URINARY TRACT INFECTION, SITE NOT SPECIFIED: ICD-10-CM

## 2023-05-03 PROCEDURE — 99024 POSTOP FOLLOW-UP VISIT: CPT

## 2023-06-07 ENCOUNTER — OUTPATIENT (OUTPATIENT)
Dept: OUTPATIENT SERVICES | Facility: HOSPITAL | Age: 43
LOS: 1 days | Discharge: HOME | End: 2023-06-07
Payer: COMMERCIAL

## 2023-06-07 ENCOUNTER — APPOINTMENT (OUTPATIENT)
Dept: GASTROENTEROLOGY | Facility: HOSPITAL | Age: 43
End: 2023-06-07

## 2023-06-07 VITALS — HEIGHT: 61 IN | WEIGHT: 257.94 LBS

## 2023-06-07 PROCEDURE — 74328 X-RAY BILE DUCT ENDOSCOPY: CPT | Mod: 26

## 2023-06-07 PROCEDURE — 43264 ERCP REMOVE DUCT CALCULI: CPT

## 2023-06-07 PROCEDURE — C1889: CPT

## 2023-06-07 PROCEDURE — 43265 ERCP LITHOTRIPSY CALCULI: CPT | Mod: 59

## 2023-06-07 PROCEDURE — 74330 X-RAY BILE/PANC ENDOSCOPY: CPT

## 2023-06-07 PROCEDURE — 43275 ERCP REMOVE FORGN BODY DUCT: CPT

## 2023-06-07 PROCEDURE — C1769: CPT

## 2023-06-07 DEVICE — IMPLANTABLE DEVICE: Type: IMPLANTABLE DEVICE | Status: FUNCTIONAL

## 2023-06-07 DEVICE — STONE BASKET TETRACATCH V ROTATABLE 4-WIRE 2.8MM: Type: IMPLANTABLE DEVICE | Status: FUNCTIONAL

## 2023-06-07 DEVICE — HYDRATOME 44: Type: IMPLANTABLE DEVICE | Status: FUNCTIONAL

## 2023-06-07 DEVICE — BLLN EXTRCTR PRO RX-S 9-12MM: Type: IMPLANTABLE DEVICE | Status: FUNCTIONAL

## 2023-06-07 NOTE — PRE-ANESTHESIA EVALUATION ADULT - NSANTHPEFT_GEN_ALL_CORE
General: Appearance is consistent with chronological age. No abnormal facies. Morbid obesity  Airway:  See Mallampati score  EENT: Anicteric sclera; oropharynx clear, moist mucus membranes  Cardiovascular:  Regular rate and rhythm  Respiratory: Unlabored breathing  Neurological: Awake and alert, moves all extremities  Constitutional: MET>4

## 2023-06-07 NOTE — PRE-ANESTHESIA EVALUATION ADULT - NSANTHPMHFT_GEN_ALL_CORE
·  General: Obesity, BMI > 40  Cardiac: Denies HTN, HLD, MI/Angina/Heart Failure, Arrhythmia, Murmur/Valvular Disorder. >4 METS  Pulmonary: Denies Asthma, COPD, CIRO  Renal: Denies kidney dysfunction  Hepatic: Positive for choledocholithiasis following cholecystectomy. Denies liver dysfunction at baseline.  Gastrointestinal: Denies GERD/IBD  Endocrine: Denies DM or thyroid dysfunction  Neurologic: Denies stroke/seizure disorder  Hematologic: Denies anemia, blood clotting disorder, blood thinning medication.    PSH: Uterine fibroid removal via vaginal hysteroscopy, Cholecystectomy.

## 2023-06-07 NOTE — PRE-ANESTHESIA EVALUATION ADULT - NSANTHGENDERRD_ENT_A_CORE
----- Message from Yair Rodriguez MD sent at 6/28/2018 11:22 AM CDT -----  PLease arrange follow with DR. Smiley on his next clinic with routine labs     Thanks    NK  
Pt. scheduled  
No

## 2023-07-05 ENCOUNTER — APPOINTMENT (OUTPATIENT)
Dept: BARIATRICS | Facility: CLINIC | Age: 43
End: 2023-07-05
Payer: COMMERCIAL

## 2023-07-05 VITALS
SYSTOLIC BLOOD PRESSURE: 136 MMHG | HEART RATE: 90 BPM | BODY MASS INDEX: 47.23 KG/M2 | OXYGEN SATURATION: 99 % | HEIGHT: 61 IN | TEMPERATURE: 98 F | WEIGHT: 250.13 LBS | DIASTOLIC BLOOD PRESSURE: 81 MMHG

## 2023-07-05 DIAGNOSIS — E66.01 MORBID (SEVERE) OBESITY DUE TO EXCESS CALORIES: ICD-10-CM

## 2023-07-05 DIAGNOSIS — K80.50 CALCULUS OF BILE DUCT W/OUT CHOLANGITIS OR CHOLECYSTITIS W/OUT OBSTRUCTION: ICD-10-CM

## 2023-07-05 DIAGNOSIS — Z90.49 ACQUIRED ABSENCE OF OTHER SPECIFIED PARTS OF DIGESTIVE TRACT: ICD-10-CM

## 2023-07-05 PROCEDURE — 99024 POSTOP FOLLOW-UP VISIT: CPT

## 2023-07-05 NOTE — ADDENDUM
[FreeTextEntry1] : I, Dr. Jason Hauser, spent 25 minutes with the patient >50% counseling/coordination of care including, reviewing the patient's history, performing an examination, reviewing relevant labs and radiographic imaging, reviewing PCP and consultant notes, discussion of medical and surgical management of the diagnosis as well as associated risks and benefits, and completing documentation.\par

## 2023-07-05 NOTE — HISTORY OF PRESENT ILLNESS
[de-identified] : Pt is a 43 y/o F with pmhx of morbid obesity s/p lap cholecystectomy for acute cholecystitis on 4/20/23, complicated by choledocholithiasis and readmitted for ERCP and sphincterotomy with pancreatic and biliary stent, now s/p ERCP with stent and multiple stones removed on 7/7/23 with , who presents today feeling well for f/u care. Pt states she has been feeling well overall and states the radiating back pain that she previously had has since resolved. Pt denies any abd pn, n,v, fevers. Reports tolerating her diet and reminded pt about the importance of following a low fat diet. States she has resumed her usual home activities. Pt advised to contact us and f/u prn should she develop any new abd pn, back pain, n,v. No other concerns at this time.

## 2023-07-05 NOTE — ASSESSMENT
[FreeTextEntry1] : Pt is a 41 y/o F with pmhx of morbid obesity s/p lap cholecystectomy for acute cholecystitis on 4/20/23, complicated by choledocholithiasis and readmitted for ERCP and sphincterotomy with pancreatic and biliary stent, now s/p ERCP with stent and multiple stones removed on 7/7/23 with , who presents today feeling well for f/u care. Pt states she has been feeling well overall and states the radiating back pain that she previously had has since resolved. Pt denies any abd pn, n,v, fevers. Reports tolerating her diet and reminded pt about the importance of following a low fat diet. States she has resumed her usual home activities. Pt advised to contact us and f/u prn should she develop any new abd pn, back pain, n,v. No other concerns at this time.

## (undated) DEVICE — XI DRAPE ARM

## (undated) DEVICE — SUT MONOCRYL 4-0 27" PS-2 UNDYED

## (undated) DEVICE — DRSG DERMABOND 0.7ML

## (undated) DEVICE — MARKING PEN W RULER

## (undated) DEVICE — SNARE CAPTIVATOR II 20MM

## (undated) DEVICE — ELCTR GROUNDING PAD ADULT COVIDIEN

## (undated) DEVICE — TUBING STRYKER PNEUMOCLEAR HIGH FLOW

## (undated) DEVICE — WARMING BLANKET UPPER ADULT

## (undated) DEVICE — VENODYNE/SCD SLEEVE CALF MEDIUM

## (undated) DEVICE — XI ENDOWRIST SUCTION IRRIGATOR 8MM

## (undated) DEVICE — DRAPE 3/4 SHEET 52X76"

## (undated) DEVICE — DRAIN RESERVOIR FOR JACKSON PRATT 100CC CARDINAL

## (undated) DEVICE — SOL IRR BAG NS 0.9% 3000ML

## (undated) DEVICE — XI SEAL UNIV 5- 8 MM

## (undated) DEVICE — D HELP - CLEARVIEW CLEARIFY SYSTEM

## (undated) DEVICE — DRAIN BLAKE 10FR ROUND

## (undated) DEVICE — XI OBTURATOR OPTICAL BLADELESS 8MM

## (undated) DEVICE — XI DRAPE COLUMN

## (undated) DEVICE — INSUFFLATION NDL COVIDIEN SURGINEEDLE VERESS 120MM

## (undated) DEVICE — Device

## (undated) DEVICE — SUT VICRYL 0 27" UR-6

## (undated) DEVICE — SUT VICRYL 0 54" TIES

## (undated) DEVICE — BLADE SURGICAL #15 CARBON

## (undated) DEVICE — XI TIP COVER

## (undated) DEVICE — SNARE CAPTIVATOR COLD RND STIFF 10X2.4X2.8MM 240CM

## (undated) DEVICE — SUT ETHILON 2-0 18" PS-2

## (undated) DEVICE — PACK GENERAL LAPAROSCOPY

## (undated) DEVICE — ENDOCATCH 10MM SPECIMEN POUCH

## (undated) DEVICE — GLV 7 PROTEXIS (WHITE)

## (undated) DEVICE — DRAPE 1/2 SHEET 40X57"

## (undated) DEVICE — TIP METZENBAUM SCISSOR MONOPOLAR ENDOCUT (ORANGE)